# Patient Record
Sex: FEMALE | Race: BLACK OR AFRICAN AMERICAN | NOT HISPANIC OR LATINO | Employment: UNEMPLOYED | URBAN - NONMETROPOLITAN AREA
[De-identification: names, ages, dates, MRNs, and addresses within clinical notes are randomized per-mention and may not be internally consistent; named-entity substitution may affect disease eponyms.]

---

## 2018-01-08 ENCOUNTER — HOSPITAL ENCOUNTER (OUTPATIENT)
Facility: HOSPITAL | Age: 23
Setting detail: OBSERVATION
Discharge: HOME | End: 2018-01-11
Attending: INTERNAL MEDICINE | Admitting: INTERNAL MEDICINE

## 2018-01-08 ENCOUNTER — APPOINTMENT (OUTPATIENT)
Dept: CT IMAGING | Facility: HOSPITAL | Age: 23
End: 2018-01-08

## 2018-01-08 ENCOUNTER — APPOINTMENT (OUTPATIENT)
Dept: CARDIOLOGY | Facility: HOSPITAL | Age: 23
End: 2018-01-08
Attending: INTERNAL MEDICINE

## 2018-01-08 DIAGNOSIS — R07.89 OTHER CHEST PAIN: Primary | ICD-10-CM

## 2018-01-08 PROBLEM — R07.9 CHEST PAIN: Status: ACTIVE | Noted: 2018-01-08

## 2018-01-08 LAB
ALBUMIN SERPL-MCNC: 3.9 G/DL (ref 3.4–4.8)
ALBUMIN/GLOB SERPL: 1.1 G/DL (ref 1.1–1.8)
ALP SERPL-CCNC: 67 U/L (ref 38–126)
ALT SERPL W P-5'-P-CCNC: 26 U/L (ref 9–52)
ANION GAP SERPL CALCULATED.3IONS-SCNC: 8 MMOL/L (ref 5–15)
ARTICHOKE IGE QN: 34 MG/DL (ref 1–129)
AST SERPL-CCNC: 22 U/L (ref 14–36)
B-HCG UR QL: NEGATIVE
BACTERIA UR QL AUTO: ABNORMAL /HPF
BASOPHILS # BLD AUTO: 0.02 10*3/MM3 (ref 0–0.2)
BASOPHILS NFR BLD AUTO: 0.2 % (ref 0–2)
BH CV ECHO MEAS - ACS: 1.5 CM
BH CV ECHO MEAS - AO ISTHMUS: 2.1 CM
BH CV ECHO MEAS - AO MAX PG (FULL): 1.9 MMHG
BH CV ECHO MEAS - AO MAX PG: 4.8 MMHG
BH CV ECHO MEAS - AO MEAN PG (FULL): 1.5 MMHG
BH CV ECHO MEAS - AO MEAN PG: 3 MMHG
BH CV ECHO MEAS - AO ROOT AREA (BSA CORRECTED): 1.4
BH CV ECHO MEAS - AO ROOT AREA: 4.4 CM^2
BH CV ECHO MEAS - AO ROOT DIAM: 2.4 CM
BH CV ECHO MEAS - AO V2 MAX: 109.8 CM/SEC
BH CV ECHO MEAS - AO V2 MEAN: 80.9 CM/SEC
BH CV ECHO MEAS - AO V2 VTI: 21.7 CM
BH CV ECHO MEAS - ASC AORTA: 2.5 CM
BH CV ECHO MEAS - AVA(I,A): 2.6 CM^2
BH CV ECHO MEAS - AVA(I,D): 2.6 CM^2
BH CV ECHO MEAS - AVA(V,A): 2.5 CM^2
BH CV ECHO MEAS - AVA(V,D): 2.5 CM^2
BH CV ECHO MEAS - BSA(HAYCOCK): 1.6 M^2
BH CV ECHO MEAS - BSA: 1.6 M^2
BH CV ECHO MEAS - BZI_BMI: 21.1 KILOGRAMS/M^2
BH CV ECHO MEAS - BZI_METRIC_HEIGHT: 165.1 CM
BH CV ECHO MEAS - BZI_METRIC_WEIGHT: 57.6 KG
BH CV ECHO MEAS - EDV(CUBED): 66.5 ML
BH CV ECHO MEAS - EDV(TEICH): 72.2 ML
BH CV ECHO MEAS - EF(CUBED): 79.9 %
BH CV ECHO MEAS - EF(TEICH): 72.8 %
BH CV ECHO MEAS - ESV(CUBED): 13.4 ML
BH CV ECHO MEAS - ESV(TEICH): 19.6 ML
BH CV ECHO MEAS - FS: 41.4 %
BH CV ECHO MEAS - IVS/LVPW: 1.4
BH CV ECHO MEAS - IVSD: 0.83 CM
BH CV ECHO MEAS - LA DIMENSION: 2.1 CM
BH CV ECHO MEAS - LA/AO: 0.9
BH CV ECHO MEAS - LV MASS(C)D: 81.9 GRAMS
BH CV ECHO MEAS - LV MASS(C)DI: 50.2 GRAMS/M^2
BH CV ECHO MEAS - LV MAX PG: 2.9 MMHG
BH CV ECHO MEAS - LV MEAN PG: 1.5 MMHG
BH CV ECHO MEAS - LV V1 MAX: 84.9 CM/SEC
BH CV ECHO MEAS - LV V1 MEAN: 57.6 CM/SEC
BH CV ECHO MEAS - LV V1 VTI: 17.2 CM
BH CV ECHO MEAS - LVIDD: 4.1 CM
BH CV ECHO MEAS - LVIDS: 2.4 CM
BH CV ECHO MEAS - LVOT AREA (M): 3.1 CM^2
BH CV ECHO MEAS - LVOT AREA: 3.2 CM^2
BH CV ECHO MEAS - LVOT DIAM: 2 CM
BH CV ECHO MEAS - LVPWD: 0.59 CM
BH CV ECHO MEAS - MV A MAX VEL: 52.9 CM/SEC
BH CV ECHO MEAS - MV DEC SLOPE: 680.9 CM/SEC^2
BH CV ECHO MEAS - MV E MAX VEL: 117.4 CM/SEC
BH CV ECHO MEAS - MV E/A: 2.2
BH CV ECHO MEAS - MV MAX PG: 7.1 MMHG
BH CV ECHO MEAS - MV MEAN PG: 2.5 MMHG
BH CV ECHO MEAS - MV P1/2T MAX VEL: 131.9 CM/SEC
BH CV ECHO MEAS - MV P1/2T: 56.7 MSEC
BH CV ECHO MEAS - MV V2 MAX: 132.8 CM/SEC
BH CV ECHO MEAS - MV V2 MEAN: 72.7 CM/SEC
BH CV ECHO MEAS - MV V2 VTI: 32.5 CM
BH CV ECHO MEAS - MVA P1/2T LCG: 1.7 CM^2
BH CV ECHO MEAS - MVA(P1/2T): 3.9 CM^2
BH CV ECHO MEAS - MVA(VTI): 1.7 CM^2
BH CV ECHO MEAS - PA MAX PG: 3.2 MMHG
BH CV ECHO MEAS - PA V2 MAX: 90.1 CM/SEC
BH CV ECHO MEAS - PI END-D VEL: 141.2 CM/SEC
BH CV ECHO MEAS - RVDD: 3 CM
BH CV ECHO MEAS - SI(AO): 57.9 ML/M^2
BH CV ECHO MEAS - SI(CUBED): 32.6 ML/M^2
BH CV ECHO MEAS - SI(LVOT): 34 ML/M^2
BH CV ECHO MEAS - SI(TEICH): 32.2 ML/M^2
BH CV ECHO MEAS - SV(AO): 94.5 ML
BH CV ECHO MEAS - SV(CUBED): 53.1 ML
BH CV ECHO MEAS - SV(LVOT): 55.5 ML
BH CV ECHO MEAS - SV(TEICH): 52.6 ML
BH CV ECHO MEAS - TR MAX VEL: 231.7 CM/SEC
BILIRUB SERPL-MCNC: 0.7 MG/DL (ref 0.2–1.3)
BILIRUB UR QL STRIP: NEGATIVE
BUN BLD-MCNC: 10 MG/DL (ref 7–21)
BUN/CREAT SERPL: 14.3 (ref 7–25)
CALCIUM SPEC-SCNC: 9.2 MG/DL (ref 8.4–10.2)
CHLORIDE SERPL-SCNC: 109 MMOL/L (ref 95–110)
CHOLEST SERPL-MCNC: 103 MG/DL (ref 0–199)
CLARITY UR: ABNORMAL
CO2 SERPL-SCNC: 23 MMOL/L (ref 22–31)
COLOR UR: YELLOW
CREAT BLD-MCNC: 0.7 MG/DL (ref 0.5–1)
DEPRECATED RDW RBC AUTO: 43.1 FL (ref 36.4–46.3)
EOSINOPHIL # BLD AUTO: 0.12 10*3/MM3 (ref 0–0.7)
EOSINOPHIL NFR BLD AUTO: 1.5 % (ref 0–7)
ERYTHROCYTE [DISTWIDTH] IN BLOOD BY AUTOMATED COUNT: 12.8 % (ref 11.5–14.5)
GFR SERPL CREATININE-BSD FRML MDRD: 127 ML/MIN/1.73 (ref 71–165)
GLOBULIN UR ELPH-MCNC: 3.6 GM/DL (ref 2.3–3.5)
GLUCOSE BLD-MCNC: 91 MG/DL (ref 60–100)
GLUCOSE UR STRIP-MCNC: NEGATIVE MG/DL
HCT VFR BLD AUTO: 35.2 % (ref 35–45)
HDLC SERPL-MCNC: 53 MG/DL (ref 60–200)
HGB BLD-MCNC: 11.7 G/DL (ref 12–15.5)
HGB UR QL STRIP.AUTO: NEGATIVE
HYALINE CASTS UR QL AUTO: ABNORMAL /LPF
IMM GRANULOCYTES # BLD: 0.02 10*3/MM3 (ref 0–0.02)
IMM GRANULOCYTES NFR BLD: 0.2 % (ref 0–0.5)
INR PPP: 1.25 (ref 0.8–1.2)
KETONES UR QL STRIP: NEGATIVE
LDLC/HDLC SERPL: 0.69 {RATIO} (ref 0–3.22)
LEUKOCYTE ESTERASE UR QL STRIP.AUTO: ABNORMAL
LYMPHOCYTES # BLD AUTO: 2.44 10*3/MM3 (ref 0.6–4.2)
LYMPHOCYTES NFR BLD AUTO: 30.5 % (ref 10–50)
MAXIMAL PREDICTED HEART RATE: 198 BPM
MCH RBC QN AUTO: 30.4 PG (ref 26.5–34)
MCHC RBC AUTO-ENTMCNC: 33.2 G/DL (ref 31.4–36)
MCV RBC AUTO: 91.4 FL (ref 80–98)
MONOCYTES # BLD AUTO: 0.4 10*3/MM3 (ref 0–0.9)
MONOCYTES NFR BLD AUTO: 5 % (ref 0–12)
NEUTROPHILS # BLD AUTO: 5.01 10*3/MM3 (ref 2–8.6)
NEUTROPHILS NFR BLD AUTO: 62.6 % (ref 37–80)
NITRITE UR QL STRIP: NEGATIVE
PH UR STRIP.AUTO: 6 [PH] (ref 5–9)
PLATELET # BLD AUTO: 254 10*3/MM3 (ref 150–450)
PMV BLD AUTO: 10.2 FL (ref 8–12)
POTASSIUM BLD-SCNC: 4 MMOL/L (ref 3.5–5.1)
PROT SERPL-MCNC: 7.5 G/DL (ref 6.3–8.6)
PROT UR QL STRIP: NEGATIVE
PROTHROMBIN TIME: 15.7 SECONDS (ref 11.1–15.3)
RBC # BLD AUTO: 3.85 10*6/MM3 (ref 3.77–5.16)
RBC # UR: ABNORMAL /HPF
REF LAB TEST METHOD: ABNORMAL
SODIUM BLD-SCNC: 140 MMOL/L (ref 137–145)
SP GR UR STRIP: 1.01 (ref 1–1.03)
SQUAMOUS #/AREA URNS HPF: ABNORMAL /HPF
STRESS TARGET HR: 168 BPM
TRIGL SERPL-MCNC: 66 MG/DL (ref 20–199)
TROPONIN I SERPL-MCNC: 0.38 NG/ML
TROPONIN I SERPL-MCNC: 0.61 NG/ML
TROPONIN I SERPL-MCNC: 0.92 NG/ML
TROPONIN I SERPL-MCNC: 0.98 NG/ML
TROPONIN I SERPL-MCNC: 1.04 NG/ML
UROBILINOGEN UR QL STRIP: ABNORMAL
WBC NRBC COR # BLD: 8.01 10*3/MM3 (ref 3.2–9.8)
WBC UR QL AUTO: ABNORMAL /HPF

## 2018-01-08 PROCEDURE — 94640 AIRWAY INHALATION TREATMENT: CPT

## 2018-01-08 PROCEDURE — 84484 ASSAY OF TROPONIN QUANT: CPT | Performed by: INTERNAL MEDICINE

## 2018-01-08 PROCEDURE — 94760 N-INVAS EAR/PLS OXIMETRY 1: CPT

## 2018-01-08 PROCEDURE — 93010 ELECTROCARDIOGRAM REPORT: CPT | Performed by: INTERNAL MEDICINE

## 2018-01-08 PROCEDURE — G0378 HOSPITAL OBSERVATION PER HR: HCPCS

## 2018-01-08 PROCEDURE — 85025 COMPLETE CBC W/AUTO DIFF WBC: CPT | Performed by: INTERNAL MEDICINE

## 2018-01-08 PROCEDURE — 0 IOPAMIDOL PER 1 ML: Performed by: INTERNAL MEDICINE

## 2018-01-08 PROCEDURE — 96376 TX/PRO/DX INJ SAME DRUG ADON: CPT

## 2018-01-08 PROCEDURE — 96374 THER/PROPH/DIAG INJ IV PUSH: CPT

## 2018-01-08 PROCEDURE — 81025 URINE PREGNANCY TEST: CPT | Performed by: NURSE PRACTITIONER

## 2018-01-08 PROCEDURE — 94799 UNLISTED PULMONARY SVC/PX: CPT

## 2018-01-08 PROCEDURE — 99226 PR SBSQ OBSERVATION CARE/DAY 35 MINUTES: CPT | Performed by: NURSE PRACTITIONER

## 2018-01-08 PROCEDURE — G0379 DIRECT REFER HOSPITAL OBSERV: HCPCS

## 2018-01-08 PROCEDURE — 85610 PROTHROMBIN TIME: CPT | Performed by: INTERNAL MEDICINE

## 2018-01-08 PROCEDURE — 25010000002 MORPHINE PER 10 MG: Performed by: INTERNAL MEDICINE

## 2018-01-08 PROCEDURE — 80061 LIPID PANEL: CPT | Performed by: INTERNAL MEDICINE

## 2018-01-08 PROCEDURE — 75574 CT ANGIO HRT W/3D IMAGE: CPT

## 2018-01-08 PROCEDURE — 80053 COMPREHEN METABOLIC PANEL: CPT | Performed by: INTERNAL MEDICINE

## 2018-01-08 PROCEDURE — 81001 URINALYSIS AUTO W/SCOPE: CPT | Performed by: INTERNAL MEDICINE

## 2018-01-08 PROCEDURE — 93005 ELECTROCARDIOGRAM TRACING: CPT | Performed by: INTERNAL MEDICINE

## 2018-01-08 PROCEDURE — 93306 TTE W/DOPPLER COMPLETE: CPT

## 2018-01-08 PROCEDURE — 93306 TTE W/DOPPLER COMPLETE: CPT | Performed by: INTERNAL MEDICINE

## 2018-01-08 PROCEDURE — 93005 ELECTROCARDIOGRAM TRACING: CPT | Performed by: NURSE PRACTITIONER

## 2018-01-08 RX ORDER — ACETAMINOPHEN 325 MG/1
650 TABLET ORAL EVERY 6 HOURS PRN
Status: DISCONTINUED | OUTPATIENT
Start: 2018-01-08 | End: 2018-01-11 | Stop reason: HOSPADM

## 2018-01-08 RX ORDER — ONDANSETRON 2 MG/ML
4 INJECTION INTRAMUSCULAR; INTRAVENOUS EVERY 6 HOURS PRN
Status: DISCONTINUED | OUTPATIENT
Start: 2018-01-08 | End: 2018-01-11 | Stop reason: HOSPADM

## 2018-01-08 RX ORDER — BUDESONIDE AND FORMOTEROL FUMARATE DIHYDRATE 160; 4.5 UG/1; UG/1
2 AEROSOL RESPIRATORY (INHALATION)
Status: DISCONTINUED | OUTPATIENT
Start: 2018-01-08 | End: 2018-01-11 | Stop reason: HOSPADM

## 2018-01-08 RX ORDER — SODIUM CHLORIDE 9 MG/ML
75 INJECTION, SOLUTION INTRAVENOUS CONTINUOUS
Status: DISCONTINUED | OUTPATIENT
Start: 2018-01-08 | End: 2018-01-09

## 2018-01-08 RX ORDER — MORPHINE SULFATE 2 MG/ML
1 INJECTION, SOLUTION INTRAMUSCULAR; INTRAVENOUS EVERY 4 HOURS PRN
Status: DISCONTINUED | OUTPATIENT
Start: 2018-01-08 | End: 2018-01-11 | Stop reason: HOSPADM

## 2018-01-08 RX ORDER — ALBUTEROL SULFATE 90 UG/1
2 AEROSOL, METERED RESPIRATORY (INHALATION) EVERY 4 HOURS PRN
COMMUNITY

## 2018-01-08 RX ORDER — ASPIRIN 81 MG/1
81 TABLET ORAL DAILY
Status: DISCONTINUED | OUTPATIENT
Start: 2018-01-08 | End: 2018-01-09

## 2018-01-08 RX ORDER — SODIUM CHLORIDE 0.9 % (FLUSH) 0.9 %
1-10 SYRINGE (ML) INJECTION AS NEEDED
Status: DISCONTINUED | OUTPATIENT
Start: 2018-01-08 | End: 2018-01-11 | Stop reason: HOSPADM

## 2018-01-08 RX ORDER — IPRATROPIUM BROMIDE AND ALBUTEROL SULFATE 2.5; .5 MG/3ML; MG/3ML
3 SOLUTION RESPIRATORY (INHALATION)
Status: DISCONTINUED | OUTPATIENT
Start: 2018-01-08 | End: 2018-01-10

## 2018-01-08 RX ORDER — NITROGLYCERIN 0.4 MG/1
0.4 TABLET SUBLINGUAL
Status: DISCONTINUED | OUTPATIENT
Start: 2018-01-08 | End: 2018-01-11 | Stop reason: HOSPADM

## 2018-01-08 RX ORDER — FAMOTIDINE 40 MG/1
40 TABLET, FILM COATED ORAL DAILY
Status: DISCONTINUED | OUTPATIENT
Start: 2018-01-08 | End: 2018-01-11 | Stop reason: HOSPADM

## 2018-01-08 RX ADMIN — METOROPROLOL TARTRATE 2.5 MG: 5 INJECTION, SOLUTION INTRAVENOUS at 11:28

## 2018-01-08 RX ADMIN — ACETAMINOPHEN 650 MG: 325 TABLET ORAL at 20:46

## 2018-01-08 RX ADMIN — FAMOTIDINE 40 MG: 40 TABLET ORAL at 10:37

## 2018-01-08 RX ADMIN — ASPIRIN 81 MG: 81 TABLET, COATED ORAL at 10:37

## 2018-01-08 RX ADMIN — NITROGLYCERIN 0.4 MG: 0.4 TABLET SUBLINGUAL at 14:20

## 2018-01-08 RX ADMIN — BUDESONIDE AND FORMOTEROL FUMARATE DIHYDRATE 2 PUFF: 160; 4.5 AEROSOL RESPIRATORY (INHALATION) at 20:09

## 2018-01-08 RX ADMIN — MORPHINE SULFATE 1 MG: 2 INJECTION, SOLUTION INTRAMUSCULAR; INTRAVENOUS at 18:11

## 2018-01-08 RX ADMIN — IOPAMIDOL 70 ML: 755 INJECTION, SOLUTION INTRAVENOUS at 15:45

## 2018-01-08 RX ADMIN — SODIUM CHLORIDE 75 ML/HR: 9 INJECTION, SOLUTION INTRAVENOUS at 10:40

## 2018-01-08 RX ADMIN — IPRATROPIUM BROMIDE AND ALBUTEROL SULFATE 3 ML: 2.5; .5 SOLUTION RESPIRATORY (INHALATION) at 15:39

## 2018-01-08 RX ADMIN — MORPHINE SULFATE 1 MG: 2 INJECTION, SOLUTION INTRAMUSCULAR; INTRAVENOUS at 13:01

## 2018-01-08 RX ADMIN — NITROGLYCERIN 0.4 MG: 0.4 TABLET SUBLINGUAL at 14:14

## 2018-01-08 RX ADMIN — IPRATROPIUM BROMIDE AND ALBUTEROL SULFATE 3 ML: 2.5; .5 SOLUTION RESPIRATORY (INHALATION) at 20:02

## 2018-01-08 RX ADMIN — IPRATROPIUM BROMIDE AND ALBUTEROL SULFATE 3 ML: 2.5; .5 SOLUTION RESPIRATORY (INHALATION) at 11:44

## 2018-01-08 RX ADMIN — METOROPROLOL TARTRATE 2.5 MG: 5 INJECTION, SOLUTION INTRAVENOUS at 10:43

## 2018-01-08 RX ADMIN — NITROGLYCERIN 0.4 MG: 0.4 TABLET SUBLINGUAL at 20:26

## 2018-01-08 NOTE — CONSULTS
"    [facility] Consult    Referring Provider: Johny Otero MD    Reason for Consultation: Chest pain with abnormal troponin  Cardiologist: Dr Larose    Patient Care Team:  No Known Provider as PCP - General    Subjective .     History of present illness:    The patient is a 22 year old AA female who was transferred from [facility] in [place] earlier this morning.   According to review of medical record, the patient arrived to [facility] at approximately 2:45 AM on date 01/08/2018.  Electrocardiogram was found to be normal sinus rhythm without evidence of ST-T wave abnormality.  Laboratory diagnostics were performed with findings of abnormal troponin at 0.254 to be repeated within the hour with subsequent finding of 0.442.  It was at that point where transfer to [facility] was made.  Further review of medical records from [facility] R with positive findings for THC which is reported to be a daily use.  She denies any use of other recreational drugs.    She indicates onset of pain while at rest at approximately 4PM on the afternoon prior to presentation.    Chest Pain:  Location: Substernal  Duration: Approximately 18 hours  Quality: Sharp  Intensity: 10/10 with onset now 6/10  Precipitating factors: None identified  Aggravating factors: None identified  Alleviating factors: \"Medication given at [facility]\"  Associated symptoms: None identified    Family history: Negative for coronary artery disease in first-degree relative  Social history: Daily use of marijuana; reported negative for use of any other recreational drug; negative for tobacco or alcohol  Past medical history: Unremarkable    Review of Systems   Constitutional: Negative for chills and fever.   HENT: Negative for congestion.    Respiratory: Negative for apnea, cough, choking, chest tightness, shortness of breath, wheezing and stridor.    Cardiovascular: Positive for chest pain (As presenting). " "Negative for palpitations and leg swelling.   Gastrointestinal: Negative for abdominal distention.   Neurological: Negative for dizziness, syncope and light-headedness.   Psychiatric/Behavioral: The patient is not nervous/anxious.      History  No past medical history on file., No past surgical history on file., No family history on file.,   Social History   Substance Use Topics   • Smoking status: Not on file   • Smokeless tobacco: Not on file   • Alcohol use Not on file   ,  and Allergies:  Review of patient's allergies indicates no known allergies.    Objective     Vital Sign Min/Max for last 24 hours  Temp  Min: 97.7 °F (36.5 °C)  Max: 97.7 °F (36.5 °C)   BP  Min: 106/66  Max: 106/66   Pulse  Min: 68  Max: 70   Resp  Min: 18  Max: 18   SpO2  Min: 98 %  Max: 98 %   No Data Recorded   Weight  Min: 57.8 kg (127 lb 7 oz)  Max: 57.8 kg (127 lb 7 oz)     Flowsheet Rows         First Filed Value    Admission Height  165.1 cm (65\") Documented at 01/08/2018 0741    Admission Weight  57.8 kg (127 lb 7 oz) Documented at 01/08/2018 0738           Physical Exam   Constitutional: She is oriented to person, place, and time. She appears well-nourished.   HENT:   Head: Atraumatic.   Neck: No JVD present.   Cardiovascular: Normal rate, regular rhythm, S1 normal, S2 normal and normal heart sounds.  Exam reveals no S3 and no S4.    No murmur heard.  Pulses:       Radial pulses are 2+ on the right side, and 2+ on the left side.   Pulmonary/Chest: Effort normal. No respiratory distress. She has no wheezes. She has no rales.   Abdominal: Soft. She exhibits no distension.   Musculoskeletal: She exhibits no edema.   Neurological: She is alert and oriented to person, place, and time.   Skin: Skin is warm and dry.   Psychiatric: She has a normal mood and affect. Her behavior is normal. Judgment and thought content normal.   Vitals reviewed.      Current Facility-Administered Medications:   •  acetaminophen (TYLENOL) tablet 650 mg, 650 mg, " Oral, Q6H PRN, Vignesh Collins MD  •  aspirin EC tablet 81 mg, 81 mg, Oral, Daily, Vignesh Collins MD  •  budesonide-formoterol (SYMBICORT) 160-4.5 MCG/ACT inhaler 2 puff, 2 puff, Inhalation, BID - RT, Vignesh Collins MD  •  famotidine (PEPCID) tablet 40 mg, 40 mg, Oral, Daily, Vignesh Collins MD  •  ipratropium-albuterol (DUO-NEB) nebulizer solution 3 mL, 3 mL, Nebulization, 4x Daily - RT, Vignesh Collins MD  •  metoprolol tartrate (LOPRESSOR) injection 2.5 mg, 2.5 mg, Intravenous, Q6H, SALTY [place]  •  ondansetron (ZOFRAN) injection 4 mg, 4 mg, Intravenous, Q6H PRN, Vignesh Collins MD  •  sodium chloride 0.9 % flush 1-10 mL, 1-10 mL, Intravenous, PRN, Vignesh Collins MD  •  sodium chloride 0.9 % infusion, 75 mL/hr, Intravenous, Continuous, Vignesh Collins MD    Prescriptions Prior to Admission   Medication Sig Dispense Refill Last Dose   • albuterol (PROVENTIL HFA;VENTOLIN HFA) 108 (90 Base) MCG/ACT inhaler Inhale 2 puffs Every 4 (Four) Hours As Needed for Wheezing or Shortness of Air.   2018 at Unknown time   • fluticasone-salmeterol (ADVAIR) 100-50 MCG/DOSE DISKUS Inhale 1 puff 2 (Two) Times a Day.   2018 at Unknown time     Data Reviewed:   Medical records available from [facility] have been reviewed  EC2018 2: 45AM - Reviewed; Repeated approximately 11:25 AM no changes noted    Echocardiogram: Pending as ordered per hospitalist    Results Review:  Lab Results (last 24 hours)     Procedure Component Value Units Date/Time    Protime-INR [[id number]] Collected:  18 09    Specimen:  Blood Updated:  18 1007    Troponin [[id number]] Collected:  1856    Specimen:  Blood Updated:  18 1007    Comprehensive Metabolic Panel [[id number]] Collected:  18    Specimen:  Blood Updated:  18 1007    Lipid Panel [[id number]] Collected:  18    Specimen:  Blood Updated:  18 1007    Troponin [[id number]] Collected:   01/08/18 0956    Specimen:  Blood Updated:  01/08/18 1007    CBC Auto Differential [[id number]]  (Abnormal) Collected:  01/08/18 0956    Specimen:  Blood Updated:  01/08/18 1011     WBC 8.01 10*3/mm3      RBC 3.85 10*6/mm3      Hemoglobin 11.7 (L) g/dL      Hematocrit 35.2 %      MCV 91.4 fL      MCH 30.4 pg      MCHC 33.2 g/dL      RDW 12.8 %      RDW-SD 43.1 fl      MPV 10.2 fL      Platelets 254 10*3/mm3      Neutrophil % 62.6 %      Lymphocyte % 30.5 %      Monocyte % 5.0 %      Eosinophil % 1.5 %      Basophil % 0.2 %      Immature Grans % 0.2 %      Neutrophils, Absolute 5.01 10*3/mm3      Lymphocytes, Absolute 2.44 10*3/mm3      Monocytes, Absolute 0.40 10*3/mm3      Eosinophils, Absolute 0.12 10*3/mm3      Basophils, Absolute 0.02 10*3/mm3      Immature Grans, Absolute 0.02 10*3/mm3         Imaging Results (last 24 hours)     ** No results found for the last 24 hours. **        Assessment/Plan     Active Problems:    Chest pain    The patient presents with some atypical chest pain in the setting of low probability for coronary artery disease based on risk factor analysis.  Serial troponins have been abnormal/indeterminate.  Electrocardiogram is without evidence of ST-T wave abnormality.    At this time we will proceed with coronary CTA with further recommendations based upon those findings.  Urine pregnancy is negative - evaluation prior to CTA.    Transthoracic echocardiogram was ordered upon admission per hospitalist team.    I discussed the patients findings and my recommendations with patient and family. All questions have been answered.     Maggie Macario, APRN  01/08/18  10:25 AM

## 2018-01-08 NOTE — H&P
[facility] Medicine Services  INPATIENT HISTORY AND PHYSICAL       Patient Care Team:  No Known Provider as PCP - General    Chief complaint No chief complaint on file.      Subjective     Patient is a 22 y.o. female is free of COPD who was transferred from [facility] to [facility] this morning secondary to chest pain with elevated troponin.  Patient started having chest pain yesterday with radiation to the left upper extremity and associated with numbness and tingling sensation.  She denies nausea, vomiting, fever, chills, palpitation, diaphoresis, syncope or presyncope.  She does admit that the pain is worse with inspiration.  Her ECG was normal and her troponin was 0.254.  Patient's urinary drug screen came back positive for cannabis.  CBC, comprehensive metabolic panel ,chest x-ray and CT pulmonary angiography were unremarkable.    Family and social history: Patient lives alone and able to take care of her activities of daily living.  She periodically smokes marijuana and denies history of tobacco, alcohol or any other illicit drug use.  There is family history of hypertension, diabetes and heart disease.  No family history of premature cardiovascular events.    Review of Systems   Review of Systems   Constitutional: Negative for activity change, appetite change, chills, diaphoresis, fatigue and fever.   HENT: Negative for trouble swallowing and voice change.    Eyes: Negative for photophobia and visual disturbance.   Respiratory: Negative for cough, choking, chest tightness, shortness of breath, wheezing and stridor.    Cardiovascular: Positive for chest pain. Negative for palpitations and leg swelling.   Gastrointestinal: Negative for abdominal distention, abdominal pain, blood in stool, constipation, diarrhea, nausea and vomiting.   Endocrine: Negative for cold intolerance, heat intolerance, polydipsia, polyphagia and polyuria.   Genitourinary:  Negative for decreased urine volume, difficulty urinating, dysuria, enuresis, flank pain, frequency, hematuria and urgency.   Musculoskeletal: Negative for arthralgias, gait problem, myalgias, neck pain and neck stiffness.   Skin: Negative for pallor, rash and wound.   Neurological: Negative for dizziness, tremors, seizures, syncope, facial asymmetry, speech difficulty, weakness, light-headedness, numbness and headaches.   Hematological: Does not bruise/bleed easily.   Psychiatric/Behavioral: Negative for agitation, behavioral problems and confusion.       History: COPD  No past medical history on file.  No past surgical history on file.  No family history on file.  Social History   Substance Use Topics   • Smoking status: Not on file   • Smokeless tobacco: Not on file   • Alcohol use Not on file     Prescriptions Prior to Admission   Medication Sig Dispense Refill Last Dose   • albuterol (PROVENTIL HFA;VENTOLIN HFA) 108 (90 Base) MCG/ACT inhaler Inhale 2 puffs Every 4 (Four) Hours As Needed for Wheezing or Shortness of Air.   1/7/2018 at Unknown time   • fluticasone-salmeterol (ADVAIR) 100-50 MCG/DOSE DISKUS Inhale 1 puff 2 (Two) Times a Day.   1/7/2018 at Unknown time     Allergies:  Review of patient's allergies indicates no known allergies.  Prior to Admission medications    Medication Sig Start Date End Date Taking? Authorizing Provider   albuterol (PROVENTIL HFA;VENTOLIN HFA) 108 (90 Base) MCG/ACT inhaler Inhale 2 puffs Every 4 (Four) Hours As Needed for Wheezing or Shortness of Air.   Yes Historical Provider, MD   fluticasone-salmeterol (ADVAIR) 100-50 MCG/DOSE DISKUS Inhale 1 puff 2 (Two) Times a Day.   Yes Historical Provider, MD       Objective     Vital Signs    Temp:  [97.7 °F (36.5 °C)] 97.7 °F (36.5 °C)  Heart Rate:  [68-70] 70  Resp:  [18] 18  BP: (106)/(66) 106/66    Physical Exam:      Physical Exam   Constitutional: She is oriented to person, place, and time. She appears well-developed and  well-nourished. She is cooperative. No distress.   HENT:   Head: Normocephalic and atraumatic.   Right Ear: External ear normal.   Left Ear: External ear normal.   Nose: Nose normal.   Mouth/Throat: Oropharynx is clear and moist.   Eyes: Conjunctivae and EOM are normal. Pupils are equal, round, and reactive to light.   Neck: Normal range of motion. Neck supple. No JVD present. No thyromegaly present.   Cardiovascular: Normal rate, regular rhythm, normal heart sounds and intact distal pulses.  Exam reveals no gallop and no friction rub.    No murmur heard.  Pulmonary/Chest: Effort normal and breath sounds normal. No stridor. No respiratory distress. She has no wheezes. She has no rales. She exhibits no tenderness.   She has reproducible pain in the left anterior chest wall.   Abdominal: Soft. Bowel sounds are normal. She exhibits no distension and no mass. There is no tenderness. There is no rebound and no guarding. No hernia.   Musculoskeletal: Normal range of motion. She exhibits no edema, tenderness or deformity.   Neurological: She is alert and oriented to person, place, and time. She has normal reflexes. No cranial nerve deficit. She exhibits normal muscle tone. Coordination normal.   Skin: Skin is warm and dry. No rash noted. She is not diaphoretic. No erythema. No pallor.   She has multiple tattoos.   Psychiatric: She has a normal mood and affect. Her behavior is normal. Judgment and thought content normal.   Nursing note and vitals reviewed.      Results Review:   Pending.        Invalid input(s): LABALBU, PROT                    Imaging Results (last 7 days)     ** No results found for the last 168 hours. **          Assessment/Plan   1.  Chest pain: Likely noncardiac as the pain is reproducible.  We'll admit to observation to rule out MI.  Will trend troponin, ECG and check echocardiogram.  Consult cardiologist for additional risk stratification which may include CT coronary angios.  2.  History of COPD:  Continue bronchodilators.  3.  Begin GI and DVT prophylaxis.          I discussed the patients findings and my recommendations with patient and her mother.     Vignesh Collins MD  01/08/18  9:32 AM        Total Time Spent: 50 minutes    EMR Dragon/Transcription disclaimer:   Much of this encounter note is an electronic transcription/translation of spoken language to printed text. The electronic translation of spoken language may permit erroneous, or at times, nonsensical words or phrases to be inadvertently transcribed; Although I have reviewed the note for such errors, some may still exist.

## 2018-01-08 NOTE — PLAN OF CARE
Problem: Patient Care Overview (Adult)  Goal: Plan of Care Review  Outcome: Ongoing (interventions implemented as appropriate)   01/08/18 1622   Coping/Psychosocial Response Interventions   Plan Of Care Reviewed With patient   Patient Care Overview   Progress improving   Outcome Evaluation   Outcome Summary/Follow up Plan initial admission to [facility]. Chest pain decreased but still present. Echo and CTA done today.       Problem: Acute Coronary Syndrome (ACS) (Adult)  Goal: Signs and Symptoms of Listed Potential Problems Will be Absent or Manageable (Acute Coronary Syndrome)  Outcome: Ongoing (interventions implemented as appropriate)

## 2018-01-09 LAB
ANION GAP SERPL CALCULATED.3IONS-SCNC: 7 MMOL/L (ref 5–15)
BACTERIA UR QL AUTO: ABNORMAL /HPF
BASOPHILS # BLD AUTO: 0.01 10*3/MM3 (ref 0–0.2)
BASOPHILS NFR BLD AUTO: 0.1 % (ref 0–2)
BILIRUB UR QL STRIP: NEGATIVE
BUN BLD-MCNC: 5 MG/DL (ref 7–21)
BUN/CREAT SERPL: 7.1 (ref 7–25)
CALCIUM SPEC-SCNC: 9 MG/DL (ref 8.4–10.2)
CHLORIDE SERPL-SCNC: 108 MMOL/L (ref 95–110)
CLARITY UR: CLEAR
CO2 SERPL-SCNC: 23 MMOL/L (ref 22–31)
COLOR UR: YELLOW
CREAT BLD-MCNC: 0.7 MG/DL (ref 0.5–1)
DEPRECATED RDW RBC AUTO: 42.4 FL (ref 36.4–46.3)
EOSINOPHIL # BLD AUTO: 0.3 10*3/MM3 (ref 0–0.7)
EOSINOPHIL NFR BLD AUTO: 3.6 % (ref 0–7)
ERYTHROCYTE [DISTWIDTH] IN BLOOD BY AUTOMATED COUNT: 12.7 % (ref 11.5–14.5)
GFR SERPL CREATININE-BSD FRML MDRD: 127 ML/MIN/1.73 (ref 71–165)
GLUCOSE BLD-MCNC: 91 MG/DL (ref 60–100)
GLUCOSE UR STRIP-MCNC: NEGATIVE MG/DL
HCT VFR BLD AUTO: 32.1 % (ref 35–45)
HGB BLD-MCNC: 10.9 G/DL (ref 12–15.5)
HGB UR QL STRIP.AUTO: NEGATIVE
HYALINE CASTS UR QL AUTO: ABNORMAL /LPF
IMM GRANULOCYTES # BLD: 0.01 10*3/MM3 (ref 0–0.02)
IMM GRANULOCYTES NFR BLD: 0.1 % (ref 0–0.5)
KETONES UR QL STRIP: NEGATIVE
LEUKOCYTE ESTERASE UR QL STRIP.AUTO: ABNORMAL
LYMPHOCYTES # BLD AUTO: 1.62 10*3/MM3 (ref 0.6–4.2)
LYMPHOCYTES NFR BLD AUTO: 19.4 % (ref 10–50)
MCH RBC QN AUTO: 30.8 PG (ref 26.5–34)
MCHC RBC AUTO-ENTMCNC: 34 G/DL (ref 31.4–36)
MCV RBC AUTO: 90.7 FL (ref 80–98)
MONOCYTES # BLD AUTO: 0.74 10*3/MM3 (ref 0–0.9)
MONOCYTES NFR BLD AUTO: 8.9 % (ref 0–12)
NEUTROPHILS # BLD AUTO: 5.66 10*3/MM3 (ref 2–8.6)
NEUTROPHILS NFR BLD AUTO: 67.9 % (ref 37–80)
NITRITE UR QL STRIP: NEGATIVE
PH UR STRIP.AUTO: 6.5 [PH] (ref 5–9)
PLATELET # BLD AUTO: 226 10*3/MM3 (ref 150–450)
PMV BLD AUTO: 10.2 FL (ref 8–12)
POTASSIUM BLD-SCNC: 3.5 MMOL/L (ref 3.5–5.1)
PROT UR QL STRIP: NEGATIVE
RBC # BLD AUTO: 3.54 10*6/MM3 (ref 3.77–5.16)
RBC # UR: ABNORMAL /HPF
REF LAB TEST METHOD: ABNORMAL
SODIUM BLD-SCNC: 138 MMOL/L (ref 137–145)
SP GR UR STRIP: 1.01 (ref 1–1.03)
SQUAMOUS #/AREA URNS HPF: ABNORMAL /HPF
TROPONIN I SERPL-MCNC: 0.85 NG/ML
TROPONIN I SERPL-MCNC: 1.16 NG/ML
TROPONIN I SERPL-MCNC: 1.17 NG/ML
TROPONIN I SERPL-MCNC: 1.29 NG/ML
TROPONIN I SERPL-MCNC: 1.33 NG/ML
TROPONIN I SERPL-MCNC: 1.4 NG/ML
UROBILINOGEN UR QL STRIP: ABNORMAL
WBC NRBC COR # BLD: 8.34 10*3/MM3 (ref 3.2–9.8)
WBC UR QL AUTO: ABNORMAL /HPF

## 2018-01-09 PROCEDURE — 25010000002 ONDANSETRON PER 1 MG: Performed by: INTERNAL MEDICINE

## 2018-01-09 PROCEDURE — 84484 ASSAY OF TROPONIN QUANT: CPT | Performed by: INTERNAL MEDICINE

## 2018-01-09 PROCEDURE — 99225 PR SBSQ OBSERVATION CARE/DAY 25 MINUTES: CPT | Performed by: INTERNAL MEDICINE

## 2018-01-09 PROCEDURE — 94799 UNLISTED PULMONARY SVC/PX: CPT

## 2018-01-09 PROCEDURE — 94760 N-INVAS EAR/PLS OXIMETRY 1: CPT

## 2018-01-09 PROCEDURE — 96375 TX/PRO/DX INJ NEW DRUG ADDON: CPT

## 2018-01-09 PROCEDURE — G0378 HOSPITAL OBSERVATION PER HR: HCPCS

## 2018-01-09 PROCEDURE — 85025 COMPLETE CBC W/AUTO DIFF WBC: CPT | Performed by: INTERNAL MEDICINE

## 2018-01-09 PROCEDURE — 81001 URINALYSIS AUTO W/SCOPE: CPT | Performed by: INTERNAL MEDICINE

## 2018-01-09 PROCEDURE — 80048 BASIC METABOLIC PNL TOTAL CA: CPT | Performed by: INTERNAL MEDICINE

## 2018-01-09 RX ORDER — NAPROXEN 250 MG/1
250 TABLET ORAL 2 TIMES DAILY WITH MEALS
Status: DISCONTINUED | OUTPATIENT
Start: 2018-01-09 | End: 2018-01-11 | Stop reason: HOSPADM

## 2018-01-09 RX ADMIN — NAPROXEN 250 MG: 250 TABLET ORAL at 10:29

## 2018-01-09 RX ADMIN — BUDESONIDE AND FORMOTEROL FUMARATE DIHYDRATE 2 PUFF: 160; 4.5 AEROSOL RESPIRATORY (INHALATION) at 07:02

## 2018-01-09 RX ADMIN — ACETAMINOPHEN 650 MG: 325 TABLET ORAL at 04:58

## 2018-01-09 RX ADMIN — ONDANSETRON 4 MG: 2 INJECTION INTRAMUSCULAR; INTRAVENOUS at 05:01

## 2018-01-09 RX ADMIN — ACETAMINOPHEN 650 MG: 325 TABLET ORAL at 19:10

## 2018-01-09 RX ADMIN — NAPROXEN 250 MG: 250 TABLET ORAL at 17:15

## 2018-01-09 RX ADMIN — IPRATROPIUM BROMIDE AND ALBUTEROL SULFATE 3 ML: 2.5; .5 SOLUTION RESPIRATORY (INHALATION) at 06:52

## 2018-01-09 RX ADMIN — FAMOTIDINE 40 MG: 40 TABLET ORAL at 08:14

## 2018-01-09 RX ADMIN — IPRATROPIUM BROMIDE AND ALBUTEROL SULFATE 3 ML: 2.5; .5 SOLUTION RESPIRATORY (INHALATION) at 19:51

## 2018-01-09 RX ADMIN — IPRATROPIUM BROMIDE AND ALBUTEROL SULFATE 3 ML: 2.5; .5 SOLUTION RESPIRATORY (INHALATION) at 14:02

## 2018-01-09 RX ADMIN — BUDESONIDE AND FORMOTEROL FUMARATE DIHYDRATE 2 PUFF: 160; 4.5 AEROSOL RESPIRATORY (INHALATION) at 19:51

## 2018-01-09 RX ADMIN — ASPIRIN 81 MG: 81 TABLET, COATED ORAL at 08:14

## 2018-01-09 NOTE — PROGRESS NOTES
[facility] Medicine Services  INPATIENT PROGRESS NOTE     LOS: 0 days   Patient Care Team:  No Known Provider as PCP - General    Chief Complaint:  <principal problem not specified>      Subjective     Interval History:   Patient is seen for follow-up today.  She is doing well chest pain has since improved.  She had so far had unremarkable CTA and echocardiogram.  However troponin continues to be elevated.    Patient Complaints: As above    History taken from: Patient    Review of Systems:    Review of Systems   Constitutional: Negative for activity change, appetite change, chills, diaphoresis, fatigue and fever.   HENT: Negative for trouble swallowing and voice change.    Eyes: Negative for photophobia and visual disturbance.   Respiratory: Negative for cough, choking, chest tightness, shortness of breath, wheezing and stridor.    Cardiovascular: Negative for chest pain, palpitations and leg swelling.   Gastrointestinal: Negative for abdominal distention, abdominal pain, blood in stool, constipation, diarrhea, nausea and vomiting.   Endocrine: Negative for cold intolerance, heat intolerance, polydipsia, polyphagia and polyuria.   Genitourinary: Negative for decreased urine volume, difficulty urinating, dysuria, enuresis, flank pain, frequency, hematuria and urgency.   Musculoskeletal: Negative for arthralgias, gait problem, myalgias, neck pain and neck stiffness.   Skin: Negative for pallor, rash and wound.   Neurological: Negative for dizziness, tremors, seizures, syncope, facial asymmetry, speech difficulty, weakness, light-headedness, numbness and headaches.   Hematological: Does not bruise/bleed easily.   Psychiatric/Behavioral: Negative for agitation, behavioral problems and confusion.         Objective     Vital Signs  Temp:  [98.6 °F (37 °C)-99 °F (37.2 °C)] 98.8 °F (37.1 °C)  Heart Rate:  [66-85] 75  Resp:  [18-20] 20  BP: (102-134)/(58-85) 122/80    Physical  Exam:   Physical Exam   Constitutional: She is oriented to person, place, and time. She appears well-developed and well-nourished. She is cooperative. No distress.   HENT:   Head: Normocephalic and atraumatic.   Right Ear: External ear normal.   Left Ear: External ear normal.   Nose: Nose normal.   Mouth/Throat: Oropharynx is clear and moist.   Eyes: Conjunctivae and EOM are normal. Pupils are equal, round, and reactive to light.   Neck: Normal range of motion. Neck supple. No JVD present. No thyromegaly present.   Cardiovascular: Normal rate, regular rhythm, normal heart sounds and intact distal pulses.  Exam reveals no gallop and no friction rub.    No murmur heard.  Pulmonary/Chest: Effort normal and breath sounds normal. No stridor. No respiratory distress. She has no wheezes. She has no rales. She exhibits no tenderness.   Abdominal: Soft. Bowel sounds are normal. She exhibits no distension and no mass. There is no tenderness. There is no rebound and no guarding. No hernia.   Musculoskeletal: Normal range of motion. She exhibits no edema, tenderness or deformity.   Neurological: She is alert and oriented to person, place, and time. She has normal reflexes. She displays normal reflexes. No cranial nerve deficit. She exhibits normal muscle tone. Coordination normal.   Skin: Skin is warm and dry. No rash noted. She is not diaphoretic. No erythema. No pallor.   Psychiatric: She has a normal mood and affect. Her behavior is normal. Judgment and thought content normal.   Nursing note and vitals reviewed.         Results Review:       Results from last 7 days  Lab Units 01/09/18  0747 01/08/18  0956   SODIUM mmol/L 138 140   POTASSIUM mmol/L 3.5 4.0   CHLORIDE mmol/L 108 109   CO2 mmol/L 23.0 23.0   BUN mg/dL 5* 10   CREATININE mg/dL 0.70 0.70   GLUCOSE mg/dL 91 91   CALCIUM mg/dL 9.0 9.2   BILIRUBIN mg/dL  --  0.7   ALK PHOS U/L  --  67   ALT (SGPT) U/L  --  26   AST (SGOT) U/L  --  22               Results from last  7 days  Lab Units 01/09/18  0747 01/08/18  0956   WBC 10*3/mm3 8.34 8.01   HEMOGLOBIN g/dL 10.9* 11.7*   HEMATOCRIT % 32.1* 35.2   PLATELETS 10*3/mm3 226 254       Lab Results   Component Value Date    TROPONINI 1.160 (C) 01/09/2018       CO2   Date Value Ref Range Status   01/09/2018 23.0 22.0 - 31.0 mmol/L Final         Results from last 7 days  Lab Units 01/08/18  0956   INR  1.25*        Imaging Results (last 7 days)     Procedure Component Value Units Date/Time    CT Angiogram Coronary [[id number]] Collected:  01/08/18 1501     Updated:  01/08/18 1812    Narrative:       PROCEDURE: CT HEART CORONARY ANGIOGRAM WITH IV CONTRAST    CLINICAL HISTORY: Chest pain, R07.89 Other chest pain, elevated  troponin.    COMPARISON: None.    TECHNIQUE:  Serial axial CT images were obtained through the heart at 3 mm  thickness without contrast for calcium scoring.   Subsequently, following the intravenous administration of 70 ml  of Isovue-370, serial axial CT images were obtained through the  heart at 0.6 mm thickness utilizing retrospective  gating.   Images were obtained after premedication with 10 mg IV  metoprolol.  Post processing was performed by the radiologist at the [facility]  workstation.   3D images including vessel probing technique were also obtained.   Full field of view reconstructed images were used for evaluation  of the extracardiac tissues.  This exam was performed using radiation doses that are as low as  reasonably achievable (ALARA).  This exam was performed according to our departmental dose  optimization program, which includes automated exposure control,  adjustment of the mA and/or KV according to patient size and/or  use of iterative reconstruction technique.  Dose length product: 331.5    FINDINGS:  CALCIUM PLAQUE BURDEN:    REGION                                         CALCIUM SCORE  ([place])  Left Main                                                      0   Right Coronary Artery                                  0   Left Anterior Descending                           0   Circumflex                                                   0     Total calcium score is 0    Implication: No identifiable plaque  Risk of coronary artery disease: Very low, generally less than 5%    CTA OF THE CORONARY ARTERIES: There is good visualization of the  left main, LAD, diagonals, circumflex, and RCA. There is a type  III LAD. Coronary anatomy is right dominant.    Left Main: No calcified or soft itssue density plaque and no  stenosis.  LAD: No calcified or soft tissue density plaque and no stenosis.  Circumflex: No calcified or soft tissue density plaque and no  stenosis.  RCA: No calcified or soft tisue density plaque and no stenosis.    The aortic valve is tricuspid. There is no myocardial bridging.  On short axis views, the myocardium is homogeneous in thickness.    EXTRACARDIAC SOFT TISSUES:  Unremarkable    CT FUNCTIONAL ANALYSIS:  Ejection Fraction     58 %  Diastolic Volume     118 ml  Systolic Volume      49 ml  Stroke Volume        69 ml  Cardiac Output        4.5 L/minute      Impression:       CONCLUSION: Normal study.    Electronically signed by:  Elmer Maki MD  1/8/2018 6:11 PM CST  Workstation: [id number]                                    Medication Review:   Current Facility-Administered Medications   Medication Dose Route Frequency Provider Last Rate Last Dose   • acetaminophen (TYLENOL) tablet 650 mg  650 mg Oral Q6H PRN Vignesh Collins MD   650 mg at 01/09/18 0458   • budesonide-formoterol (SYMBICORT) 160-4.5 MCG/ACT inhaler 2 puff  2 puff Inhalation BID - RT Vignesh Collins MD   2 puff at 01/09/18 0702   • famotidine (PEPCID) tablet 40 mg  40 mg Oral Daily Vignesh Collins MD   40 mg at 01/09/18 0814   • ipratropium-albuterol (DUO-NEB) nebulizer solution 3 mL  3 mL Nebulization 4x Daily - RT Vignesh Collins MD   3 mL at 01/09/18 0652   • morphine injection 1 mg  1 mg Intravenous Q4H PRN Vignesh MAC  MD Karina   1 mg at 01/08/18 1811   • naproxen (NAPROSYN) tablet 250 mg  250 mg Oral BID With Meals Vignesh Collins MD   250 mg at 01/09/18 1029   • nitroglycerin (NITROSTAT) SL tablet 0.4 mg  0.4 mg Sublingual Q5 Min PRN Vignesh Collins MD   0.4 mg at 01/08/18 2026   • ondansetron (ZOFRAN) injection 4 mg  4 mg Intravenous Q6H PRN Vignesh Collins MD   4 mg at 01/09/18 0501   • sodium chloride 0.9 % flush 1-10 mL  1-10 mL Intravenous PRN Vignesh Collins MD             Assessment/Plan   1.  Chest pain: Likely secondary to myocarditis.  Begin nonsteroid anti-inflammatory drugs.  Input by cardiologist is appreciated.   2.  History of COPD: Stable. Continue bronchodilators.  3.  Likely discharge in a.m. if troponin is beginning to trend downwards.        Vignesh Collins MD  01/09/18      EMR Dragon/Transcription disclaimer:   Much of this encounter note is an electronic transcription/translation of spoken language to printed text. The electronic translation of spoken language may permit erroneous, or at times, nonsensical words or phrases to be inadvertently transcribed; Although I have reviewed the note for such errors, some may still exist.

## 2018-01-09 NOTE — PLAN OF CARE
Problem: Patient Care Overview (Adult)  Goal: Plan of Care Review  Outcome: Ongoing (interventions implemented as appropriate)   01/09/18 0521   Coping/Psychosocial Response Interventions   Plan Of Care Reviewed With patient   Patient Care Overview   Progress progress toward functional goals as expected     Goal: Adult Individualization and Mutuality  Outcome: Ongoing (interventions implemented as appropriate)   01/09/18 0521   Individualization   Patient Specific Preferences pt like cranberry/apple juice   Patient Specific Goals pain free by dc     Goal: Discharge Needs Assessment  Outcome: Ongoing (interventions implemented as appropriate)   01/09/18 0521   Discharge Needs Assessment   Concerns To Be Addressed no discharge needs identified   Readmission Within The Last 30 Days no previous admission in last 30 days   Equipment Needed After Discharge none   Discharge Disposition home or self-care   Current Health   Anticipated Changes Related to Illness none   Self-Care   Equipment Currently Used at Home none   Living Environment   Transportation Available car;family or friend will provide       Problem: Acute Coronary Syndrome (ACS) (Adult)  Goal: Signs and Symptoms of Listed Potential Problems Will be Absent or Manageable (Acute Coronary Syndrome)  Outcome: Ongoing (interventions implemented as appropriate)   01/09/18 0521   Acute Coronary Syndrome (ACS)   Problems Assessed (Acute Coronary Syndrome (ACS)) all   Problems Present (Acute Coronary Syndrome (ACS)) chest pain (angina)

## 2018-01-09 NOTE — PROGRESS NOTES
"Cardiovascular Daily Progress Note  Lukas Larose M.D., Ph.D., [facility]      Subjective     Interval History:   Ongoing intermittent CP.     Review of Systems - History obtained from chart review and the patient  Cardiovascular ROS: positive for - chest pain    Objective     Vital Sign Min/Max for last 24 hours  Temp  Min: 97.7 °F (36.5 °C)  Max: 99 °F (37.2 °C)   BP  Min: 102/58  Max: 134/85   Pulse  Min: 68  Max: 85   Resp  Min: 18  Max: 20   SpO2  Min: 98 %  Max: 100 %   No Data Recorded   Weight  Min: 57.8 kg (127 lb 7 oz)  Max: 57.8 kg (127 lb 7 oz)     Flowsheet Rows         First Filed Value    Admission Height  165.1 cm (65\") Documented at 01/08/2018 0741    Admission Weight  57.8 kg (127 lb 7 oz) Documented at 01/08/2018 0738        Last 3 weights    01/08/18  0738   Weight: 57.8 kg (127 lb 7 oz)     Body mass index is 21.21 kg/(m^2).    Physical Exam:   GEN: alert, appears stated age and cooperative  Body Habitus: thin  HEENT: Head: Normocephalic, no lesions, without obvious abnormality.  Neck / Thyroid: Supple, no masses, nodes, nodules or enlargement. No arcus senilis, xanthelasma or xanthomas.   JVP: 6 cm of water at 45 degrees HJR: absent      Carotid:  Upstroke: easily palpated bilaterally Volume: Normal.    Carotid Bruit:  None  Subclavian Bruit: Not present.    Chest:  Normal; TTP on left precordium Excursion: Good    I:E: Good  Pulmonary:clear to auscultation, no wheezes, rales or rhonchi, symmetric air entry      Precordium:  No palpable heaves or thrusts. P2 is not palpable.   [place]:  normal size and placement Palpable S4: Not present.   Heart rate: normal  Heart Rhythm: regular     Heart Sounds: S1: normal intensity  S2: normal intensity  S3: absent   S4: absent  Opening Snap: absent  A2-OS:  N/A  Pericardial rub: absent    Ejection click: None      Murmurs: Systolic: none  Diastolic: none  Abdomen: deferred  Extremity: no edema, cyanosis  Trophic changes: None   Pallor on elevation: Absent. "    Rubor on dependency: None  Pulses: Right radial artery has 2+ (normal) pulse and Left radial artery has 2+ (normal) pulse        DATA REVIEWED:    Lab Results (last 24 hours)     Procedure Component Value Units Date/Time    CBC Auto Differential [[id number]]  (Abnormal) Collected:  01/08/18 0956    Specimen:  Blood Updated:  01/08/18 1011     WBC 8.01 10*3/mm3      RBC 3.85 10*6/mm3      Hemoglobin 11.7 (L) g/dL      Hematocrit 35.2 %      MCV 91.4 fL      MCH 30.4 pg      MCHC 33.2 g/dL      RDW 12.8 %      RDW-SD 43.1 fl      MPV 10.2 fL      Platelets 254 10*3/mm3      Neutrophil % 62.6 %      Lymphocyte % 30.5 %      Monocyte % 5.0 %      Eosinophil % 1.5 %      Basophil % 0.2 %      Immature Grans % 0.2 %      Neutrophils, Absolute 5.01 10*3/mm3      Lymphocytes, Absolute 2.44 10*3/mm3      Monocytes, Absolute 0.40 10*3/mm3      Eosinophils, Absolute 0.12 10*3/mm3      Basophils, Absolute 0.02 10*3/mm3      Immature Grans, Absolute 0.02 10*3/mm3     Protime-INR [[id number]]  (Abnormal) Collected:  01/08/18 0956    Specimen:  Blood Updated:  01/08/18 1027     Protime 15.7 (H) Seconds      INR 1.25 (H)    Narrative:       Therapeutic range for most indications is 2.0-3.0 INR,  or 2.5-3.5 for mechanical heart valves.    Troponin [[id number]]  (Abnormal) Collected:  01/08/18 0956    Specimen:  Blood Updated:  01/08/18 1047     Troponin I 0.384 (C) ng/mL     Pregnancy, Urine - Urine, Clean Catch [[id number]]  (Normal) Collected:  01/08/18 1043    Specimen:  Urine from Urine, Clean Catch Updated:  01/08/18 1103     HCG, Urine QL Negative    Urinalysis With / Microscopic If Indicated - Urine, Clean Catch [[id number]]  (Abnormal) Collected:  01/08/18 1043    Specimen:  Urine from Urine, Clean Catch Updated:  01/08/18 1103     Color, UA Yellow     Appearance, UA Cloudy (A)     pH, UA 6.0     Specific Gravity, UA 1.014     Glucose, UA Negative     Ketones, UA Negative     Bilirubin, UA Negative     Blood, UA Negative      Protein, UA Negative     Leuk Esterase, UA Small (1+) (A)     Nitrite, UA Negative     Urobilinogen, UA 0.2 E.U./dL    Urinalysis, Microscopic Only - Urine, Clean Catch [[id number]]  (Abnormal) Collected:  01/08/18 1043    Specimen:  Urine from Urine, Clean Catch Updated:  01/08/18 1104     RBC, UA 0-2 (A) /HPF      WBC, UA 13-20 (A) /HPF      Bacteria, UA 1+ (A) /HPF      Squamous Epithelial Cells, UA 6-12 (A) /HPF      Hyaline Casts, UA 3-6 /LPF      Methodology Automated Microscopy    Comprehensive Metabolic Panel [[id number]]  (Abnormal) Collected:  01/08/18 0956    Specimen:  Blood Updated:  01/08/18 1116     Glucose 91 mg/dL      BUN 10 mg/dL      Creatinine 0.70 mg/dL      Sodium 140 mmol/L      Potassium 4.0 mmol/L      Chloride 109 mmol/L      CO2 23.0 mmol/L      Calcium 9.2 mg/dL      Total Protein 7.5 g/dL      Albumin 3.90 g/dL      ALT (SGPT) 26 U/L      AST (SGOT) 22 U/L      Alkaline Phosphatase 67 U/L      Total Bilirubin 0.7 mg/dL      eGFR  African Amer 127 mL/min/1.73      Globulin 3.6 (H) gm/dL      A/G Ratio 1.1 g/dL      BUN/Creatinine Ratio 14.3     Anion Gap 8.0 mmol/L     Lipid Panel [[id number]]  (Abnormal) Collected:  01/08/18 0956    Specimen:  Blood Updated:  01/08/18 1127     Total Cholesterol 103 mg/dL      Triglycerides 66 mg/dL      HDL Cholesterol 53 (L) mg/dL      LDL Cholesterol  34 mg/dL      LDL/HDL Ratio 0.69    Troponin [[id number]]  (Abnormal) Collected:  01/08/18 1400    Specimen:  Blood Updated:  01/08/18 1437     Troponin I 0.609 (C) ng/mL     Troponin [[id number]]  (Abnormal) Collected:  01/08/18 1609    Specimen:  Blood Updated:  01/08/18 1711     Troponin I 1.040 (C) ng/mL     Troponin [[id number]]  (Abnormal) Collected:  01/08/18 1812    Specimen:  Blood Updated:  01/08/18 1902     Troponin I 0.980 (C) ng/mL     Troponin [[id number]]  (Abnormal) Collected:  01/08/18 2053    Specimen:  Blood Updated:  01/08/18 2149     Troponin I 0.922 (C) ng/mL     Troponin [[id number]]   (Abnormal) Collected:  01/09/18 0028    Specimen:  Blood Updated:  01/09/18 0118     Troponin I 0.852 (C) ng/mL         Imaging Results (last 24 hours)     Procedure Component Value Units Date/Time    CT Angiogram Coronary [[id number]] Collected:  01/08/18 1501     Updated:  01/08/18 1812    Narrative:       PROCEDURE: CT HEART CORONARY ANGIOGRAM WITH IV CONTRAST    CLINICAL HISTORY: Chest pain, R07.89 Other chest pain, elevated  troponin.    COMPARISON: None.    TECHNIQUE:  Serial axial CT images were obtained through the heart at 3 mm  thickness without contrast for calcium scoring.   Subsequently, following the intravenous administration of 70 ml  of Isovue-370, serial axial CT images were obtained through the  heart at 0.6 mm thickness utilizing retrospective  gating.   Images were obtained after premedication with 10 mg IV  metoprolol.  Post processing was performed by the radiologist at the [employer]  workstation.   3D images including vessel probing technique were also obtained.   Full field of view reconstructed images were used for evaluation  of the extracardiac tissues.  This exam was performed using radiation doses that are as low as  reasonably achievable (ALARA).  This exam was performed according to our departmental dose  optimization program, which includes automated exposure control,  adjustment of the mA and/or KV according to patient size and/or  use of iterative reconstruction technique.  Dose length product: 331.5    FINDINGS:  CALCIUM PLAQUE BURDEN:    REGION                                         CALCIUM SCORE  ([place]at[place])  Left Main                                                      0   Right Coronary Artery                                 0   Left Anterior Descending                           0   Circumflex                                                   0     Total calcium score is 0    Implication: No identifiable plaque  Risk of coronary artery disease: Very low, generally less than  5%    CTA OF THE CORONARY ARTERIES: There is good visualization of the  left main, LAD, diagonals, circumflex, and RCA. There is a type  III LAD. Coronary anatomy is right dominant.    Left Main: No calcified or soft itssue density plaque and no  stenosis.  LAD: No calcified or soft tissue density plaque and no stenosis.  Circumflex: No calcified or soft tissue density plaque and no  stenosis.  RCA: No calcified or soft tisue density plaque and no stenosis.    The aortic valve is tricuspid. There is no myocardial bridging.  On short axis views, the myocardium is homogeneous in thickness.    EXTRACARDIAC SOFT TISSUES:  Unremarkable    CT FUNCTIONAL ANALYSIS:  Ejection Fraction     58 %  Diastolic Volume     118 ml  Systolic Volume      49 ml  Stroke Volume        69 ml  Cardiac Output        4.5 L/minute      Impression:       CONCLUSION: Normal study.    Electronically signed by:  Elmer Maki MD  1/8/2018 6:11 PM CST  Workstation: [id number]        Interpretation Summary   · Left Ventricle: Estimated EF appears to be in the range of 56 - 60%.  · Left ventricular diastolic function is normal.  · Right Ventricle: Normal right ventricular cavity size, wall thickness, systolic function and septal motion noted  · No valvular abnormality.  · No evidence of pulmonary hypertension is present.  · There is no evidence of pericardial effusion.           Assessment/Plan     1. Chest pain syndrome with abnormal troponin. Coronary CTA without dissection, PE or coronary anomalies. TTE with preserved LVEF and no effusion. Suspect myocarditis. She has a pleuritic element, as well. Suspect pericardial involvement, but no effusion on TTE.   -Pain mgmt, per primary team  -No current indication for ACE-I or BB given her LVEF is normal.  -Monitor      Thank you for allowing me to participate in the care of your patient.  If I can be of any further assistance, please do not hesitate contact me.          This document has been electronically  signed by Lukas Larose MD PhD on January 9, 2018 7:23 AM

## 2018-01-10 LAB
AMPHET+METHAMPHET UR QL: NEGATIVE
BARBITURATES UR QL SCN: NEGATIVE
BENZODIAZ UR QL SCN: NEGATIVE
CANNABINOIDS SERPL QL: POSITIVE
COCAINE UR QL: NEGATIVE
METHADONE UR QL SCN: NEGATIVE
OPIATES UR QL: NEGATIVE
OXYCODONE UR QL SCN: NEGATIVE
TROPONIN I SERPL-MCNC: 0.53 NG/ML
TROPONIN I SERPL-MCNC: 0.93 NG/ML
TROPONIN I SERPL-MCNC: 1.19 NG/ML
TROPONIN I SERPL-MCNC: 1.49 NG/ML

## 2018-01-10 PROCEDURE — 84484 ASSAY OF TROPONIN QUANT: CPT | Performed by: INTERNAL MEDICINE

## 2018-01-10 PROCEDURE — 94760 N-INVAS EAR/PLS OXIMETRY 1: CPT

## 2018-01-10 PROCEDURE — 80307 DRUG TEST PRSMV CHEM ANLYZR: CPT | Performed by: INTERNAL MEDICINE

## 2018-01-10 PROCEDURE — G0378 HOSPITAL OBSERVATION PER HR: HCPCS

## 2018-01-10 PROCEDURE — 25010000002 ONDANSETRON PER 1 MG: Performed by: INTERNAL MEDICINE

## 2018-01-10 PROCEDURE — 96376 TX/PRO/DX INJ SAME DRUG ADON: CPT

## 2018-01-10 PROCEDURE — 94799 UNLISTED PULMONARY SVC/PX: CPT

## 2018-01-10 PROCEDURE — 25010000002 MORPHINE PER 10 MG: Performed by: INTERNAL MEDICINE

## 2018-01-10 PROCEDURE — 63710000001 PREDNISONE PER 5 MG: Performed by: INTERNAL MEDICINE

## 2018-01-10 RX ORDER — IPRATROPIUM BROMIDE AND ALBUTEROL SULFATE 2.5; .5 MG/3ML; MG/3ML
3 SOLUTION RESPIRATORY (INHALATION) EVERY 6 HOURS PRN
Status: DISCONTINUED | OUTPATIENT
Start: 2018-01-10 | End: 2018-01-11 | Stop reason: HOSPADM

## 2018-01-10 RX ORDER — PREDNISONE 10 MG/1
10 TABLET ORAL
Status: DISCONTINUED | OUTPATIENT
Start: 2018-01-10 | End: 2018-01-11 | Stop reason: HOSPADM

## 2018-01-10 RX ADMIN — BUDESONIDE AND FORMOTEROL FUMARATE DIHYDRATE 2 PUFF: 160; 4.5 AEROSOL RESPIRATORY (INHALATION) at 07:41

## 2018-01-10 RX ADMIN — NAPROXEN 250 MG: 250 TABLET ORAL at 17:12

## 2018-01-10 RX ADMIN — BUDESONIDE AND FORMOTEROL FUMARATE DIHYDRATE 2 PUFF: 160; 4.5 AEROSOL RESPIRATORY (INHALATION) at 21:43

## 2018-01-10 RX ADMIN — IPRATROPIUM BROMIDE AND ALBUTEROL SULFATE 3 ML: 2.5; .5 SOLUTION RESPIRATORY (INHALATION) at 10:34

## 2018-01-10 RX ADMIN — PREDNISONE 10 MG: 10 TABLET ORAL at 10:17

## 2018-01-10 RX ADMIN — IPRATROPIUM BROMIDE AND ALBUTEROL SULFATE 3 ML: 2.5; .5 SOLUTION RESPIRATORY (INHALATION) at 14:26

## 2018-01-10 RX ADMIN — ACETAMINOPHEN 650 MG: 325 TABLET ORAL at 08:49

## 2018-01-10 RX ADMIN — FAMOTIDINE 40 MG: 40 TABLET ORAL at 08:08

## 2018-01-10 RX ADMIN — ONDANSETRON 4 MG: 2 INJECTION INTRAMUSCULAR; INTRAVENOUS at 09:06

## 2018-01-10 RX ADMIN — IPRATROPIUM BROMIDE AND ALBUTEROL SULFATE 3 ML: 2.5; .5 SOLUTION RESPIRATORY (INHALATION) at 07:36

## 2018-01-10 RX ADMIN — NAPROXEN 250 MG: 250 TABLET ORAL at 08:07

## 2018-01-10 RX ADMIN — MORPHINE SULFATE 1 MG: 2 INJECTION, SOLUTION INTRAMUSCULAR; INTRAVENOUS at 21:19

## 2018-01-10 NOTE — PLAN OF CARE
Problem: Patient Care Overview (Adult)  Goal: Plan of Care Review  Outcome: Ongoing (interventions implemented as appropriate)   01/10/18 0608   Coping/Psychosocial Response Interventions   Plan Of Care Reviewed With patient   Patient Care Overview   Progress progress toward functional goals as expected   Outcome Evaluation   Outcome Summary/Follow up Plan Chest pain decreased at this time. Troponin still elevated

## 2018-01-10 NOTE — PROGRESS NOTES
[facility]  INPATIENT PROGRESS NOTE     LOS: 0 days   Patient Care Team:  No Known Provider as PCP - General    Chief Complaint:  <principal problem not specified>      Subjective     Interval History:   Patient is seen for follow-up today.  She is doing well and chest pain has since improved. Troponin continues to fluctuate and higher this a.m.      Patient Complaints: As above    History taken from: Patient    Review of Systems:    Review of Systems   Constitutional: Negative for activity change, appetite change, chills, diaphoresis, fatigue and fever.   HENT: Negative for trouble swallowing and voice change.    Eyes: Negative for photophobia and visual disturbance.   Respiratory: Negative for cough, choking, chest tightness, shortness of breath, wheezing and stridor.    Cardiovascular: Negative for chest pain, palpitations and leg swelling.   Gastrointestinal: Negative for abdominal distention, abdominal pain, blood in stool, constipation, diarrhea, nausea and vomiting.   Endocrine: Negative for cold intolerance, heat intolerance, polydipsia, polyphagia and polyuria.   Genitourinary: Negative for decreased urine volume, difficulty urinating, dysuria, enuresis, flank pain, frequency, hematuria and urgency.   Musculoskeletal: Negative for arthralgias, gait problem, myalgias, neck pain and neck stiffness.   Skin: Negative for pallor, rash and wound.   Neurological: Negative for dizziness, tremors, seizures, syncope, facial asymmetry, speech difficulty, weakness, light-headedness, numbness and headaches.   Hematological: Does not bruise/bleed easily.   Psychiatric/Behavioral: Negative for agitation, behavioral problems and confusion.         Objective     Vital Signs  Temp:  [98 °F (36.7 °C)-99 °F (37.2 °C)] 98.9 °F (37.2 °C)  Heart Rate:  [] 83  Resp:  [16-20] 18  BP: ()/(56-80) 116/77    Physical Exam:   Physical Exam   Constitutional: She  is oriented to person, place, and time. She appears well-developed and well-nourished. She is cooperative. No distress.   HENT:   Head: Normocephalic and atraumatic.   Right Ear: External ear normal.   Left Ear: External ear normal.   Nose: Nose normal.   Mouth/Throat: Oropharynx is clear and moist.   Eyes: Conjunctivae and EOM are normal. Pupils are equal, round, and reactive to light.   Neck: Normal range of motion. Neck supple. No JVD present. No thyromegaly present.   Cardiovascular: Normal rate, regular rhythm, normal heart sounds and intact distal pulses.  Exam reveals no gallop and no friction rub.    No murmur heard.  Pulmonary/Chest: Effort normal and breath sounds normal. No stridor. No respiratory distress. She has no wheezes. She has no rales. She exhibits no tenderness.   Abdominal: Soft. Bowel sounds are normal. She exhibits no distension and no mass. There is no tenderness. There is no rebound and no guarding. No hernia.   Musculoskeletal: Normal range of motion. She exhibits no edema, tenderness or deformity.   Neurological: She is alert and oriented to person, place, and time. She has normal reflexes. She displays normal reflexes. No cranial nerve deficit. She exhibits normal muscle tone. Coordination normal.   Skin: Skin is warm and dry. No rash noted. She is not diaphoretic. No erythema. No pallor.   Psychiatric: She has a normal mood and affect. Her behavior is normal. Judgment and thought content normal.   Nursing note and vitals reviewed.         Results Review:         Results from last 7 days  Lab Units 01/09/18  0747 01/08/18  0956   SODIUM mmol/L 138 140   POTASSIUM mmol/L 3.5 4.0   CHLORIDE mmol/L 108 109   CO2 mmol/L 23.0 23.0   BUN mg/dL 5* 10   CREATININE mg/dL 0.70 0.70   GLUCOSE mg/dL 91 91   CALCIUM mg/dL 9.0 9.2   BILIRUBIN mg/dL  --  0.7   ALK PHOS U/L  --  67   ALT (SGPT) U/L  --  26   AST (SGOT) U/L  --  22               Results from last 7 days  Lab Units 01/09/18  0747  01/08/18  0956   WBC 10*3/mm3 8.34 8.01   HEMOGLOBIN g/dL 10.9* 11.7*   HEMATOCRIT % 32.1* 35.2   PLATELETS 10*3/mm3 226 254       Lab Results   Component Value Date    TROPONINI 1.490 (C) 01/10/2018       CO2   Date Value Ref Range Status   01/09/2018 23.0 22.0 - 31.0 mmol/L Final         Results from last 7 days  Lab Units 01/08/18  0956   INR  1.25*        Imaging Results (last 7 days)     Procedure Component Value Units Date/Time    CT Angiogram Coronary [[id number]] Collected:  01/08/18 1501     Updated:  01/08/18 1812    Narrative:       PROCEDURE: CT HEART CORONARY ANGIOGRAM WITH IV CONTRAST    CLINICAL HISTORY: Chest pain, R07.89 Other chest pain, elevated  troponin.    COMPARISON: None.    TECHNIQUE:  Serial axial CT images were obtained through the heart at 3 mm  thickness without contrast for calcium scoring.   Subsequently, following the intravenous administration of 70 ml  of Isovue-370, serial axial CT images were obtained through the  heart at 0.6 mm thickness utilizing retrospective  gating.   Images were obtained after premedication with 10 mg IV  metoprolol.  Post processing was performed by the radiologist at the [employer]  workstation.   3D images including vessel probing technique were also obtained.   Full field of view reconstructed images were used for evaluation  of the extracardiac tissues.  This exam was performed using radiation doses that are as low as  reasonably achievable (ALARA).  This exam was performed according to our departmental dose  optimization program, which includes automated exposure control,  adjustment of the mA and/or KV according to patient size and/or  use of iterative reconstruction technique.  Dose length product: 331.5    FINDINGS:  CALCIUM PLAQUE BURDEN:    REGION                                         CALCIUM SCORE  ([place]at[place])  Left Main                                                      0   Right Coronary Artery                                 0   Left Anterior  Descending                           0   Circumflex                                                   0     Total calcium score is 0    Implication: No identifiable plaque  Risk of coronary artery disease: Very low, generally less than 5%    CTA OF THE CORONARY ARTERIES: There is good visualization of the  left main, LAD, diagonals, circumflex, and RCA. There is a type  III LAD. Coronary anatomy is right dominant.    Left Main: No calcified or soft itssue density plaque and no  stenosis.  LAD: No calcified or soft tissue density plaque and no stenosis.  Circumflex: No calcified or soft tissue density plaque and no  stenosis.  RCA: No calcified or soft tisue density plaque and no stenosis.    The aortic valve is tricuspid. There is no myocardial bridging.  On short axis views, the myocardium is homogeneous in thickness.    EXTRACARDIAC SOFT TISSUES:  Unremarkable    CT FUNCTIONAL ANALYSIS:  Ejection Fraction     58 %  Diastolic Volume     118 ml  Systolic Volume      49 ml  Stroke Volume        69 ml  Cardiac Output        4.5 L/minute      Impression:       CONCLUSION: Normal study.    Electronically signed by:  Elmer Maki MD  1/8/2018 6:11 PM CST  Workstation: [id number]                                    Medication Review:   Current Facility-Administered Medications   Medication Dose Route Frequency Provider Last Rate Last Dose   • acetaminophen (TYLENOL) tablet 650 mg  650 mg Oral Q6H PRN Vignesh Collins MD   650 mg at 01/10/18 0849   • budesonide-formoterol (SYMBICORT) 160-4.5 MCG/ACT inhaler 2 puff  2 puff Inhalation BID - RT Vignesh Collins MD   2 puff at 01/10/18 0741   • famotidine (PEPCID) tablet 40 mg  40 mg Oral Daily Vignesh Collins MD   40 mg at 01/10/18 0808   • ipratropium-albuterol (DUO-NEB) nebulizer solution 3 mL  3 mL Nebulization 4x Daily - RT Vignesh Collins MD   3 mL at 01/10/18 1034   • morphine injection 1 mg  1 mg Intravenous Q4H PRN Vignesh Collins MD   1 mg at 01/08/18 1811    • naproxen (NAPROSYN) tablet 250 mg  250 mg Oral BID With Meals Vignesh Collins MD   250 mg at 01/10/18 0807   • nitroglycerin (NITROSTAT) SL tablet 0.4 mg  0.4 mg Sublingual Q5 Min PRN Vignesh Collins MD   0.4 mg at 01/08/18 2026   • ondansetron (ZOFRAN) injection 4 mg  4 mg Intravenous Q6H PRN Vignesh Collins MD   4 mg at 01/10/18 0906   • predniSONE (DELTASONE) tablet 10 mg  10 mg Oral Daily With Breakfast Vignesh Collins MD   10 mg at 01/10/18 1017   • sodium chloride 0.9 % flush 1-10 mL  1-10 mL Intravenous PRN Vignesh Collins MD             Assessment/Plan   1.  Chest pain: Likely secondary to myocarditis. Continue naproxen and add low dose prednisone.  Input by cardiologist is appreciated.   2.  History of COPD: Stable. Continue bronchodilators.  3.  Likely discharge in a.m. if troponin begins to trend downwards.        Vignesh Collins MD  01/10/18      EMR Dragon/Transcription disclaimer:   Much of this encounter note is an electronic transcription/translation of spoken language to printed text. The electronic translation of spoken language may permit erroneous, or at times, nonsensical words or phrases to be inadvertently transcribed; Although I have reviewed the note for such errors, some may still exist.

## 2018-01-10 NOTE — PLAN OF CARE
Problem: Patient Care Overview (Adult)  Goal: Plan of Care Review  Outcome: Ongoing (interventions implemented as appropriate)   01/10/18 2736   Coping/Psychosocial Response Interventions   Plan Of Care Reviewed With patient   Patient Care Overview   Progress progress toward functional goals as expected   Outcome Evaluation   Outcome Summary/Follow up Plan chest pain presist; troponin trending down; may remove tele to shower       Problem: Acute Coronary Syndrome (ACS) (Adult)  Goal: Signs and Symptoms of Listed Potential Problems Will be Absent or Manageable (Acute Coronary Syndrome)  Outcome: Ongoing (interventions implemented as appropriate)

## 2018-01-10 NOTE — PLAN OF CARE
Problem: Patient Care Overview (Adult)  Goal: Plan of Care Review  Outcome: Ongoing (interventions implemented as appropriate)   01/09/18 1753   Coping/Psychosocial Response Interventions   Plan Of Care Reviewed With patient       Problem: Acute Coronary Syndrome (ACS) (Adult)  Goal: Signs and Symptoms of Listed Potential Problems Will be Absent or Manageable (Acute Coronary Syndrome)  Outcome: Ongoing (interventions implemented as appropriate)

## 2018-01-11 VITALS
BODY MASS INDEX: 21.34 KG/M2 | DIASTOLIC BLOOD PRESSURE: 68 MMHG | SYSTOLIC BLOOD PRESSURE: 127 MMHG | HEIGHT: 65 IN | WEIGHT: 128.1 LBS | OXYGEN SATURATION: 100 % | TEMPERATURE: 97.2 F | RESPIRATION RATE: 18 BRPM | HEART RATE: 103 BPM

## 2018-01-11 LAB
TROPONIN I SERPL-MCNC: 0.58 NG/ML
TROPONIN I SERPL-MCNC: 0.73 NG/ML
WHOLE BLOOD HOLD SPECIMEN: NORMAL

## 2018-01-11 PROCEDURE — G0378 HOSPITAL OBSERVATION PER HR: HCPCS

## 2018-01-11 PROCEDURE — 96376 TX/PRO/DX INJ SAME DRUG ADON: CPT

## 2018-01-11 PROCEDURE — 84484 ASSAY OF TROPONIN QUANT: CPT | Performed by: INTERNAL MEDICINE

## 2018-01-11 PROCEDURE — 25010000002 ONDANSETRON PER 1 MG: Performed by: INTERNAL MEDICINE

## 2018-01-11 PROCEDURE — 63710000001 PREDNISONE PER 5 MG: Performed by: INTERNAL MEDICINE

## 2018-01-11 RX ORDER — PREDNISONE 10 MG/1
10 TABLET ORAL
Qty: 3 TABLET | Refills: 0 | Status: SHIPPED | OUTPATIENT
Start: 2018-01-12 | End: 2018-01-15

## 2018-01-11 RX ORDER — NAPROXEN 250 MG/1
250 TABLET ORAL 2 TIMES DAILY WITH MEALS
Qty: 8 TABLET | Refills: 0 | Status: SHIPPED | OUTPATIENT
Start: 2018-01-11

## 2018-01-11 RX ADMIN — PREDNISONE 10 MG: 10 TABLET ORAL at 08:42

## 2018-01-11 RX ADMIN — NAPROXEN 250 MG: 250 TABLET ORAL at 08:42

## 2018-01-11 RX ADMIN — FAMOTIDINE 40 MG: 40 TABLET ORAL at 08:41

## 2018-01-11 RX ADMIN — ONDANSETRON 4 MG: 2 INJECTION INTRAMUSCULAR; INTRAVENOUS at 09:17

## 2018-01-11 NOTE — PLAN OF CARE
Problem: Patient Care Overview (Adult)  Goal: Plan of Care Review  Outcome: Ongoing (interventions implemented as appropriate)   01/11/18 0440   Coping/Psychosocial Response Interventions   Plan Of Care Reviewed With patient   Patient Care Overview   Progress progress toward functional goals as expected   Outcome Evaluation   Outcome Summary/Follow up Plan Pt complained of chest pain after brething treatment but improved quickly. Troponins continue to trend down.     Goal: Adult Individualization and Mutuality  Outcome: Ongoing (interventions implemented as appropriate)   01/09/18 0521   Individualization   Patient Specific Preferences pt like cranberry/apple juice   Patient Specific Goals pain free by dc     Goal: Discharge Needs Assessment  Outcome: Ongoing (interventions implemented as appropriate)   01/09/18 0521   Discharge Needs Assessment   Concerns To Be Addressed no discharge needs identified   Readmission Within The Last 30 Days no previous admission in last 30 days   Equipment Needed After Discharge none   Discharge Disposition home or self-care   Current Health   Anticipated Changes Related to Illness none   Self-Care   Equipment Currently Used at Home none   Living Environment   Transportation Available car;family or friend will provide       Problem: Acute Coronary Syndrome (ACS) (Adult)  Goal: Signs and Symptoms of Listed Potential Problems Will be Absent or Manageable (Acute Coronary Syndrome)  Outcome: Ongoing (interventions implemented as appropriate)   01/11/18 0440   Acute Coronary Syndrome (ACS)   Problems Assessed (Acute Coronary Syndrome (ACS)) all   Problems Present (Acute Coronary Syndrome (ACS)) chest pain (angina)      01/11/18 0440   Acute Coronary Syndrome (ACS)   Problems Assessed (Acute Coronary Syndrome (ACS)) all   Problems Present (Acute Coronary Syndrome (ACS)) chest pain (angina)

## 2018-01-11 NOTE — DISCHARGE SUMMARY
[facility] Medicine Services  DISCHARGE SUMMARY       Date of Admission: 1/8/2018  Date of Discharge:  1/11/2018  Primary Care Physician: No Known Provider    Presenting Problem/History of Present Illness:  Chest pain [R07.9]      Patient is a 22 y.o. female  with history of COPD who was transferred from [facility] to [facility] this morning secondary to chest pain with elevated troponin.  Patient started having chest pain a day prior with radiation to the left upper extremity and associated with numbness and tingling sensation.  She denies nausea, vomiting, fever, chills, palpitation, diaphoresis, syncope or presyncope.  She does admit that the pain is worse with inspiration.  Her ECG was normal and her troponin was 0.254.  Patient's urinary drug screen came back positive for cannabis.  CBC, comprehensive metabolic panel ,chest x-ray and CT pulmonary angiography were unremarkable.    Final Discharge Diagnoses:  Hospital Problem List     Chest pain          Consults:   Consults     Date and Time Order Name Status Description    1/8/2018 0932 Inpatient Consult to Cardiology Completed           Procedures Performed:   None              Pertinent Test Results:   Lab Results (last 7 days)     Procedure Component Value Units Date/Time    CBC Auto Differential [[id number]]  (Abnormal) Collected:  01/08/18 0956    Specimen:  Blood Updated:  01/08/18 1011     WBC 8.01 10*3/mm3      RBC 3.85 10*6/mm3      Hemoglobin 11.7 (L) g/dL      Hematocrit 35.2 %      MCV 91.4 fL      MCH 30.4 pg      MCHC 33.2 g/dL      RDW 12.8 %      RDW-SD 43.1 fl      MPV 10.2 fL      Platelets 254 10*3/mm3      Neutrophil % 62.6 %      Lymphocyte % 30.5 %      Monocyte % 5.0 %      Eosinophil % 1.5 %      Basophil % 0.2 %      Immature Grans % 0.2 %      Neutrophils, Absolute 5.01 10*3/mm3      Lymphocytes, Absolute 2.44 10*3/mm3      Monocytes, Absolute 0.40 10*3/mm3      Eosinophils,  Absolute 0.12 10*3/mm3      Basophils, Absolute 0.02 10*3/mm3      Immature Grans, Absolute 0.02 10*3/mm3     Protime-INR [[id number]]  (Abnormal) Collected:  01/08/18 0956    Specimen:  Blood Updated:  01/08/18 1027     Protime 15.7 (H) Seconds      INR 1.25 (H)    Narrative:       Therapeutic range for most indications is 2.0-3.0 INR,  or 2.5-3.5 for mechanical heart valves.    Troponin [[id number]]  (Abnormal) Collected:  01/08/18 0956    Specimen:  Blood Updated:  01/08/18 1047     Troponin I 0.384 (C) ng/mL     Pregnancy, Urine - Urine, Clean Catch [[id number]]  (Normal) Collected:  01/08/18 1043    Specimen:  Urine from Urine, Clean Catch Updated:  01/08/18 1103     HCG, Urine QL Negative    Urinalysis With / Microscopic If Indicated - Urine, Clean Catch [[id number]]  (Abnormal) Collected:  01/08/18 1043    Specimen:  Urine from Urine, Clean Catch Updated:  01/08/18 1103     Color, UA Yellow     Appearance, UA Cloudy (A)     pH, UA 6.0     Specific Gravity, UA 1.014     Glucose, UA Negative     Ketones, UA Negative     Bilirubin, UA Negative     Blood, UA Negative     Protein, UA Negative     Leuk Esterase, UA Small (1+) (A)     Nitrite, UA Negative     Urobilinogen, UA 0.2 E.U./dL    Urinalysis, Microscopic Only - Urine, Clean Catch [[id number]]  (Abnormal) Collected:  01/08/18 1043    Specimen:  Urine from Urine, Clean Catch Updated:  01/08/18 1104     RBC, UA 0-2 (A) /HPF      WBC, UA 13-20 (A) /HPF      Bacteria, UA 1+ (A) /HPF      Squamous Epithelial Cells, UA 6-12 (A) /HPF      Hyaline Casts, UA 3-6 /LPF      Methodology Automated Microscopy    Comprehensive Metabolic Panel [[id number]]  (Abnormal) Collected:  01/08/18 0956    Specimen:  Blood Updated:  01/08/18 1116     Glucose 91 mg/dL      BUN 10 mg/dL      Creatinine 0.70 mg/dL      Sodium 140 mmol/L      Potassium 4.0 mmol/L      Chloride 109 mmol/L      CO2 23.0 mmol/L      Calcium 9.2 mg/dL      Total Protein 7.5 g/dL      Albumin 3.90 g/dL      ALT  (SGPT) 26 U/L      AST (SGOT) 22 U/L      Alkaline Phosphatase 67 U/L      Total Bilirubin 0.7 mg/dL      eGFR  African Amer 127 mL/min/1.73      Globulin 3.6 (H) gm/dL      A/G Ratio 1.1 g/dL      BUN/Creatinine Ratio 14.3     Anion Gap 8.0 mmol/L     Lipid Panel [[id number]]  (Abnormal) Collected:  01/08/18 0956    Specimen:  Blood Updated:  01/08/18 1127     Total Cholesterol 103 mg/dL      Triglycerides 66 mg/dL      HDL Cholesterol 53 (L) mg/dL      LDL Cholesterol  34 mg/dL      LDL/HDL Ratio 0.69    Troponin [[id number]]  (Abnormal) Collected:  01/08/18 1400    Specimen:  Blood Updated:  01/08/18 1437     Troponin I 0.609 (C) ng/mL     Troponin [[id number]]  (Abnormal) Collected:  01/08/18 1609    Specimen:  Blood Updated:  01/08/18 1711     Troponin I 1.040 (C) ng/mL     Troponin [[id number]]  (Abnormal) Collected:  01/08/18 1812    Specimen:  Blood Updated:  01/08/18 1902     Troponin I 0.980 (C) ng/mL     Troponin [[id number]]  (Abnormal) Collected:  01/08/18 2053    Specimen:  Blood Updated:  01/08/18 2149     Troponin I 0.922 (C) ng/mL     Troponin [[id number]]  (Abnormal) Collected:  01/09/18 0028    Specimen:  Blood Updated:  01/09/18 0118     Troponin I 0.852 (C) ng/mL     Basic Metabolic Panel [[id number]]  (Abnormal) Collected:  01/09/18 0747    Specimen:  Blood Updated:  01/09/18 0819     Glucose 91 mg/dL      BUN 5 (L) mg/dL      Creatinine 0.70 mg/dL      Sodium 138 mmol/L      Potassium 3.5 mmol/L      Chloride 108 mmol/L      CO2 23.0 mmol/L      Calcium 9.0 mg/dL      eGFR  African Amer 127 mL/min/1.73      BUN/Creatinine Ratio 7.1     Anion Gap 7.0 mmol/L     Troponin [[id number]]  (Abnormal) Collected:  01/09/18 0747    Specimen:  Blood Updated:  01/09/18 0833     Troponin I 1.330 (C) ng/mL     CBC Auto Differential [[id number]]  (Abnormal) Collected:  01/09/18 0747    Specimen:  Blood Updated:  01/09/18 0840     WBC 8.34 10*3/mm3      RBC 3.54 (L) 10*6/mm3      Hemoglobin 10.9 (L) g/dL       Hematocrit 32.1 (L) %      MCV 90.7 fL      MCH 30.8 pg      MCHC 34.0 g/dL      RDW 12.7 %      RDW-SD 42.4 fl      MPV 10.2 fL      Platelets 226 10*3/mm3      Neutrophil % 67.9 %      Lymphocyte % 19.4 %      Monocyte % 8.9 %      Eosinophil % 3.6 %      Basophil % 0.1 %      Immature Grans % 0.1 %      Neutrophils, Absolute 5.66 10*3/mm3      Lymphocytes, Absolute 1.62 10*3/mm3      Monocytes, Absolute 0.74 10*3/mm3      Eosinophils, Absolute 0.30 10*3/mm3      Basophils, Absolute 0.01 10*3/mm3      Immature Grans, Absolute 0.01 10*3/mm3     Troponin [[id number]]  (Abnormal) Collected:  01/09/18 0940    Specimen:  Blood Updated:  01/09/18 1035     Troponin I 1.160 (C) ng/mL     Urinalysis With / Microscopic If Indicated - Urine, Clean Catch [[id number]]  (Abnormal) Collected:  01/09/18 1209    Specimen:  Urine from Urine, Clean Catch Updated:  01/09/18 1242     Color, UA Yellow     Appearance, UA Clear     pH, UA 6.5     Specific Gravity, UA 1.010     Glucose, UA Negative     Ketones, UA Negative     Bilirubin, UA Negative     Blood, UA Negative     Protein, UA Negative     Leuk Esterase, UA Moderate (2+) (A)     Nitrite, UA Negative     Urobilinogen, UA 0.2 E.U./dL    Urinalysis, Microscopic Only - Urine, Clean Catch [[id number]]  (Abnormal) Collected:  01/09/18 1209    Specimen:  Urine from Urine, Clean Catch Updated:  01/09/18 1244     RBC, UA 0-2 (A) /HPF      WBC, UA 6-12 (A) /HPF      Bacteria, UA 1+ (A) /HPF      Squamous Epithelial Cells, UA 6-12 (A) /HPF      Hyaline Casts, UA 3-6 /LPF      Methodology Automated Microscopy    Troponin [[id number]]  (Abnormal) Collected:  01/09/18 1325    Specimen:  Blood Updated:  01/09/18 1415     Troponin I 1.170 (C) ng/mL     Troponin [[id number]]  (Abnormal) Collected:  01/09/18 1433    Specimen:  Blood Updated:  01/09/18 1524     Troponin I 1.290 (C) ng/mL     Troponin [[id number]]  (Abnormal) Collected:  01/09/18 2056    Specimen:  Blood Updated:  01/09/18 2142      Troponin I 1.400 (C) ng/mL     Troponin [[id number]]  (Abnormal) Collected:  01/09/18 2323    Specimen:  Blood Updated:  01/10/18 0033     Troponin I 1.190 (C) ng/mL     Troponin [[id number]]  (Abnormal) Collected:  01/10/18 0706    Specimen:  Blood Updated:  01/10/18 0805     Troponin I 1.490 (C) ng/mL     Troponin [[id number]]  (Abnormal) Collected:  01/10/18 1250    Specimen:  Blood Updated:  01/10/18 1342     Troponin I 0.928 (C) ng/mL     Troponin [[id number]]  (Abnormal) Collected:  01/10/18 1738    Specimen:  Blood Updated:  01/10/18 1843     Troponin I 0.531 (C) ng/mL     Urine Drug Screen - Urine, Clean Catch [[id number]]  (Abnormal) Collected:  01/10/18 1831    Specimen:  Urine from Urine, Clean Catch Updated:  01/10/18 1902     Amphetamine Screen, Urine Negative     Barbiturates Screen, Urine Negative     Benzodiazepine Screen, Urine Negative     Cocaine Screen, Urine Negative     Methadone Screen, Urine Negative     Opiate Screen Negative     Oxycodone Screen, Urine Negative     THC, Screen, Urine Positive (A)    Narrative:       Negative Thresholds For Drugs Screened in Urine:     Amphetamines          500 ng/ml  Barbiturates          200 ng/ml  Benzodiazepines       200 ng/ml  Cocaine               150 ng/ml  Methadone             300 ng/mL  Opiates               300 ng/mL  Oxycodone             100 ng/mL  THC                   20 ng/mL    The normal value for all drugs tested is negative. This report includes final unconfirmed screening results.  A positive result by this assay can be, at your request, sent to the Reference Lab for confirmation by gas chromatography. Unconfirmed results must not be used for non-medical purposes, such as employment or legal testing. Clinical consideration should be applied to any drug of abuse test result, particularly when unconfirmed results are used.    Extra Tubes [[id number]] Collected:  01/10/18 2354    Specimen:  Blood from Blood, Venous Line Updated:  01/11/18 0103     Narrative:       The following orders were created for panel order Extra Tubes.  Procedure                               Abnormality         Status                     ---------                               -----------         ------                     Lavender Top[[id number]]                                     Final result                 Please view results for these tests on the individual orders.    Lavender Top [[id number]] Collected:  01/10/18 2354    Specimen:  Blood Updated:  01/11/18 0103     Extra Tube hold for add-on      Auto resulted       Troponin [[id number]]  (Abnormal) Collected:  01/10/18 2354    Specimen:  Blood Updated:  01/11/18 0132     Troponin I 0.579 (C) ng/mL     Troponin [[id number]]  (Abnormal) Collected:  01/11/18 0541    Specimen:  Blood Updated:  01/11/18 0629     Troponin I 0.727 (C) ng/mL         Imaging Results (last 7 days)     Procedure Component Value Units Date/Time    CT Angiogram Coronary [[id number]] Collected:  01/08/18 1501     Updated:  01/08/18 1812    Narrative:       PROCEDURE: CT HEART CORONARY ANGIOGRAM WITH IV CONTRAST    CLINICAL HISTORY: Chest pain, R07.89 Other chest pain, elevated  troponin.    COMPARISON: None.    TECHNIQUE:  Serial axial CT images were obtained through the heart at 3 mm  thickness without contrast for calcium scoring.   Subsequently, following the intravenous administration of 70 ml  of Isovue-370, serial axial CT images were obtained through the  heart at 0.6 mm thickness utilizing retrospective  gating.   Images were obtained after premedication with 10 mg IV  metoprolol.  Post processing was performed by the radiologist at the [facility]  workstation.   3D images including vessel probing technique were also obtained.   Full field of view reconstructed images were used for evaluation  of the extracardiac tissues.  This exam was performed using radiation doses that are as low as  reasonably achievable (ALARA).  This exam was performed according  to our departmental dose  optimization program, which includes automated exposure control,  adjustment of the mA and/or KV according to patient size and/or  use of iterative reconstruction technique.  Dose length product: 331.5    FINDINGS:  CALCIUM PLAQUE BURDEN:    REGION                                         CALCIUM SCORE  ([place])  Left Main                                                      0   Right Coronary Artery                                 0   Left Anterior Descending                           0   Circumflex                                                   0     Total calcium score is 0    Implication: No identifiable plaque  Risk of coronary artery disease: Very low, generally less than 5%    CTA OF THE CORONARY ARTERIES: There is good visualization of the  left main, LAD, diagonals, circumflex, and RCA. There is a type  III LAD. Coronary anatomy is right dominant.    Left Main: No calcified or soft itssue density plaque and no  stenosis.  LAD: No calcified or soft tissue density plaque and no stenosis.  Circumflex: No calcified or soft tissue density plaque and no  stenosis.  RCA: No calcified or soft tisue density plaque and no stenosis.    The aortic valve is tricuspid. There is no myocardial bridging.  On short axis views, the myocardium is homogeneous in thickness.    EXTRACARDIAC SOFT TISSUES:  Unremarkable    CT FUNCTIONAL ANALYSIS:  Ejection Fraction     58 %  Diastolic Volume     118 ml  Systolic Volume      49 ml  Stroke Volume        69 ml  Cardiac Output        4.5 L/minute      Impression:       CONCLUSION: Normal study.    Electronically signed by:  Elmer Maki MD  1/8/2018 6:11 PM CST  Workstation: [id number]            Chief Complaint on Day of Discharge: None    Hospital Course:  The patient patient was admitted to telemetry for possible acute coronary syndrome and started on guideline directed medical therapy.  She was seen by the cardiologist and had CT coronary angios as well  "as echocardiogram which were all unremarkable..    Her troponin elevation was attributed to possible myocarditis and she was started on NSAIDs and steroid.  Her troponin level began to trend downwards and patient was asymptomatic at the time of discharge.  - She was also continued on bronchodilators due to her history of COPD.  Condition on Discharge:  Stable and improved    Physical Exam on Discharge:  /83 (BP Location: Right arm, Patient Position: Lying)  Pulse 80  Temp 97.9 °F (36.6 °C) (Temporal Artery )   Resp 18  Ht 165.1 cm (65\")  Wt 58.1 kg (128 lb 1.6 oz)  SpO2 99%  BMI 21.32 kg/m2  Physical Exam   Constitutional: She is oriented to person, place, and time. She appears well-developed and well-nourished. She is cooperative. No distress.   HENT:   Head: Normocephalic and atraumatic.   Right Ear: External ear normal.   Left Ear: External ear normal.   Nose: Nose normal.   Mouth/Throat: Oropharynx is clear and moist.   Eyes: Conjunctivae and EOM are normal. Pupils are equal, round, and reactive to light.   Neck: Normal range of motion. Neck supple. No JVD present. No thyromegaly present.   Cardiovascular: Normal rate, regular rhythm, normal heart sounds and intact distal pulses.  Exam reveals no gallop and no friction rub.    No murmur heard.  Pulmonary/Chest: Effort normal and breath sounds normal. No stridor. No respiratory distress. She has no wheezes. She has no rales. She exhibits no tenderness.   Abdominal: Soft. Bowel sounds are normal. She exhibits no distension and no mass. There is no tenderness. There is no rebound and no guarding. No hernia.   Musculoskeletal: Normal range of motion. She exhibits no edema, tenderness or deformity.   Neurological: She is alert and oriented to person, place, and time. She has normal reflexes. She displays normal reflexes. No cranial nerve deficit. She exhibits normal muscle tone. Coordination normal.   Skin: Skin is warm and dry. No rash noted. She is not " diaphoretic. No erythema. No pallor.   Psychiatric: She has a normal mood and affect. Her behavior is normal. Judgment and thought content normal.   Nursing note and vitals reviewed.        Discharge Disposition:  Home or Self Care    Discharge Medications:   Jonn Samircarolyn ARAMBULA   Home Medication Instructions DAVIS:[id number]    Printed on:01/11/18 1032   Medication Information                      albuterol (PROVENTIL HFA;VENTOLIN HFA) 108 (90 Base) MCG/ACT inhaler  Inhale 2 puffs Every 4 (Four) Hours As Needed for Wheezing or Shortness of Air.             fluticasone-salmeterol (ADVAIR) 100-50 MCG/DOSE DISKUS  Inhale 1 puff 2 (Two) Times a Day.             naproxen (NAPROSYN) 250 MG tablet  Take 1 tablet by mouth 2 (Two) Times a Day With Meals.             predniSONE (DELTASONE) 10 MG tablet  Take 1 tablet by mouth Daily With Breakfast for 3 doses.                 Discharge Diet:  heart healthy    Activity at Discharge:  as tolerated    Discharge Care Plan/Instructions: Given    Follow-up Appointments:   No future appointments.  Follow-up with family doctor in 1-2 weeks.  Test Results Pending at Discharge:     Vignesh Collins MD  01/11/18  10:35 AM    Time: 45 minutes